# Patient Record
Sex: FEMALE | Race: BLACK OR AFRICAN AMERICAN | Employment: FULL TIME | URBAN - METROPOLITAN AREA
[De-identification: names, ages, dates, MRNs, and addresses within clinical notes are randomized per-mention and may not be internally consistent; named-entity substitution may affect disease eponyms.]

---

## 2017-03-02 PROBLEM — M17.11 PRIMARY OSTEOARTHRITIS OF RIGHT KNEE: Status: ACTIVE | Noted: 2017-03-02

## 2017-03-02 PROBLEM — E66.01 MORBID OBESITY, UNSPECIFIED OBESITY TYPE (HCC): Status: ACTIVE | Noted: 2017-03-02

## 2022-05-02 ENCOUNTER — TELEPHONE (OUTPATIENT)
Dept: FAMILY MEDICINE CLINIC | Facility: CLINIC | Age: 57
End: 2022-05-02

## 2022-05-02 NOTE — TELEPHONE ENCOUNTER
I spoke with patient regarding NO SHOW status for office visit on 05/02/2022 with Dr. Tabatha Garcia. I informed patient our office has a $94.18 NO SHOW FEE POLICY so we ask that you call 24 hours before your appt time. You can also use my-chart to cancel appt before 48 hours before your appointment. Patient will be charged $40.00 for any future NO SHOW appointments. Patient verbalized understanding and agrees.

## 2022-05-16 ENCOUNTER — OFFICE VISIT (OUTPATIENT)
Dept: FAMILY MEDICINE CLINIC | Facility: CLINIC | Age: 57
End: 2022-05-16
Payer: COMMERCIAL

## 2022-05-16 ENCOUNTER — PATIENT OUTREACH (OUTPATIENT)
Dept: FAMILY MEDICINE CLINIC | Facility: CLINIC | Age: 57
End: 2022-05-16

## 2022-05-16 VITALS
DIASTOLIC BLOOD PRESSURE: 88 MMHG | RESPIRATION RATE: 14 BRPM | BODY MASS INDEX: 50.38 KG/M2 | OXYGEN SATURATION: 95 % | TEMPERATURE: 99 F | SYSTOLIC BLOOD PRESSURE: 168 MMHG | HEART RATE: 66 BPM | HEIGHT: 58 IN | WEIGHT: 240 LBS

## 2022-05-16 DIAGNOSIS — Z13.0 SCREENING FOR ENDOCRINE, NUTRITIONAL, METABOLIC AND IMMUNITY DISORDER: ICD-10-CM

## 2022-05-16 DIAGNOSIS — Z13.21 SCREENING FOR ENDOCRINE, NUTRITIONAL, METABOLIC AND IMMUNITY DISORDER: ICD-10-CM

## 2022-05-16 DIAGNOSIS — Z12.11 SCREENING FOR COLON CANCER: ICD-10-CM

## 2022-05-16 DIAGNOSIS — E66.01 MORBID OBESITY WITH BMI OF 50.0-59.9, ADULT (HCC): ICD-10-CM

## 2022-05-16 DIAGNOSIS — I10 BENIGN ESSENTIAL HYPERTENSION: ICD-10-CM

## 2022-05-16 DIAGNOSIS — Z00.00 ANNUAL PHYSICAL EXAM: Primary | ICD-10-CM

## 2022-05-16 DIAGNOSIS — H61.23 BILATERAL IMPACTED CERUMEN: ICD-10-CM

## 2022-05-16 DIAGNOSIS — Z12.4 SCREENING FOR MALIGNANT NEOPLASM OF CERVIX: ICD-10-CM

## 2022-05-16 DIAGNOSIS — Z13.228 SCREENING FOR ENDOCRINE, NUTRITIONAL, METABOLIC AND IMMUNITY DISORDER: ICD-10-CM

## 2022-05-16 DIAGNOSIS — Z13.29 SCREENING FOR ENDOCRINE, NUTRITIONAL, METABOLIC AND IMMUNITY DISORDER: ICD-10-CM

## 2022-05-16 DIAGNOSIS — E55.9 VITAMIN D DEFICIENCY: ICD-10-CM

## 2022-05-16 DIAGNOSIS — Z12.31 ENCOUNTER FOR SCREENING MAMMOGRAM FOR BREAST CANCER: ICD-10-CM

## 2022-05-16 PROBLEM — K43.2 INCISIONAL HERNIA: Status: ACTIVE | Noted: 2022-05-16

## 2022-05-16 PROBLEM — M19.90 ARTHRITIS: Status: ACTIVE | Noted: 2022-05-16

## 2022-05-16 PROBLEM — M19.90 ARTHRITIS: Status: RESOLVED | Noted: 2022-05-16 | Resolved: 2022-05-16

## 2022-05-16 LAB
ALBUMIN SERPL-MCNC: 3.7 G/DL (ref 3.4–5)
ALBUMIN/GLOB SERPL: 1.1 {RATIO} (ref 1–2)
ALP LIVER SERPL-CCNC: 105 U/L
ALT SERPL-CCNC: 16 U/L
ANION GAP SERPL CALC-SCNC: 3 MMOL/L (ref 0–18)
AST SERPL-CCNC: 18 U/L (ref 15–37)
BASOPHILS # BLD AUTO: 0.01 X10(3) UL (ref 0–0.2)
BASOPHILS NFR BLD AUTO: 0.2 %
BILIRUB SERPL-MCNC: 0.8 MG/DL (ref 0.1–2)
BUN BLD-MCNC: 16 MG/DL (ref 7–18)
CALCIUM BLD-MCNC: 9.3 MG/DL (ref 8.5–10.1)
CHLORIDE SERPL-SCNC: 107 MMOL/L (ref 98–112)
CHOLEST SERPL-MCNC: 219 MG/DL (ref ?–200)
CO2 SERPL-SCNC: 29 MMOL/L (ref 21–32)
CREAT BLD-MCNC: 0.82 MG/DL
EOSINOPHIL # BLD AUTO: 0 X10(3) UL (ref 0–0.7)
EOSINOPHIL NFR BLD AUTO: 0 %
ERYTHROCYTE [DISTWIDTH] IN BLOOD BY AUTOMATED COUNT: 13.1 %
FASTING PATIENT LIPID ANSWER: YES
FASTING STATUS PATIENT QL REPORTED: YES
GLOBULIN PLAS-MCNC: 3.4 G/DL (ref 2.8–4.4)
GLUCOSE BLD-MCNC: 94 MG/DL (ref 70–99)
HCT VFR BLD AUTO: 42.8 %
HDLC SERPL-MCNC: 59 MG/DL (ref 40–59)
HGB BLD-MCNC: 13.3 G/DL
IMM GRANULOCYTES # BLD AUTO: 0.01 X10(3) UL (ref 0–1)
IMM GRANULOCYTES NFR BLD: 0.2 %
LDLC SERPL CALC-MCNC: 148 MG/DL (ref ?–100)
LYMPHOCYTES # BLD AUTO: 1.79 X10(3) UL (ref 1–4)
LYMPHOCYTES NFR BLD AUTO: 36 %
MCH RBC QN AUTO: 27.9 PG (ref 26–34)
MCHC RBC AUTO-ENTMCNC: 31.1 G/DL (ref 31–37)
MCV RBC AUTO: 89.9 FL
MONOCYTES # BLD AUTO: 0.51 X10(3) UL (ref 0.1–1)
MONOCYTES NFR BLD AUTO: 10.3 %
NEUTROPHILS # BLD AUTO: 2.65 X10 (3) UL (ref 1.5–7.7)
NEUTROPHILS # BLD AUTO: 2.65 X10(3) UL (ref 1.5–7.7)
NEUTROPHILS NFR BLD AUTO: 53.3 %
NONHDLC SERPL-MCNC: 160 MG/DL (ref ?–130)
OSMOLALITY SERPL CALC.SUM OF ELEC: 289 MOSM/KG (ref 275–295)
PLATELET # BLD AUTO: 215 10(3)UL (ref 150–450)
POTASSIUM SERPL-SCNC: 3.9 MMOL/L (ref 3.5–5.1)
PROT SERPL-MCNC: 7.1 G/DL (ref 6.4–8.2)
RBC # BLD AUTO: 4.76 X10(6)UL
SODIUM SERPL-SCNC: 139 MMOL/L (ref 136–145)
TRIGL SERPL-MCNC: 70 MG/DL (ref 30–149)
TSI SER-ACNC: 1.94 MIU/ML (ref 0.36–3.74)
VIT D+METAB SERPL-MCNC: 23.3 NG/ML (ref 30–100)
VLDLC SERPL CALC-MCNC: 13 MG/DL (ref 0–30)
WBC # BLD AUTO: 5 X10(3) UL (ref 4–11)

## 2022-05-16 PROCEDURE — 80061 LIPID PANEL: CPT | Performed by: FAMILY MEDICINE

## 2022-05-16 PROCEDURE — 80050 GENERAL HEALTH PANEL: CPT | Performed by: FAMILY MEDICINE

## 2022-05-16 PROCEDURE — 3008F BODY MASS INDEX DOCD: CPT | Performed by: FAMILY MEDICINE

## 2022-05-16 PROCEDURE — 3079F DIAST BP 80-89 MM HG: CPT | Performed by: FAMILY MEDICINE

## 2022-05-16 PROCEDURE — 36415 COLL VENOUS BLD VENIPUNCTURE: CPT | Performed by: FAMILY MEDICINE

## 2022-05-16 PROCEDURE — 3077F SYST BP >= 140 MM HG: CPT | Performed by: FAMILY MEDICINE

## 2022-05-16 PROCEDURE — 82306 VITAMIN D 25 HYDROXY: CPT | Performed by: FAMILY MEDICINE

## 2022-05-16 RX ORDER — LOSARTAN POTASSIUM 50 MG/1
1 TABLET ORAL AS DIRECTED
COMMUNITY
End: 2022-05-16 | Stop reason: ALTCHOICE

## 2022-05-16 RX ORDER — LISINOPRIL 10 MG/1
10 TABLET ORAL DAILY
COMMUNITY
End: 2022-05-16 | Stop reason: ALTCHOICE

## 2022-05-16 RX ORDER — CIPROFLOXACIN 250 MG/1
TABLET, FILM COATED ORAL
COMMUNITY
End: 2022-05-16 | Stop reason: ALTCHOICE

## 2022-05-16 RX ORDER — LOSARTAN POTASSIUM AND HYDROCHLOROTHIAZIDE 12.5; 1 MG/1; MG/1
1 TABLET ORAL DAILY
Qty: 30 TABLET | Refills: 1 | Status: SHIPPED | OUTPATIENT
Start: 2022-05-16

## 2022-05-16 RX ORDER — HYDROCODONE BITARTRATE AND ACETAMINOPHEN 5; 325 MG/1; MG/1
TABLET ORAL
COMMUNITY
End: 2022-05-16 | Stop reason: ALTCHOICE

## 2022-05-16 RX ORDER — PHENTERMINE HYDROCHLORIDE 37.5 MG/1
TABLET ORAL
COMMUNITY
End: 2022-05-16 | Stop reason: ALTCHOICE

## 2022-05-16 NOTE — TELEPHONE ENCOUNTER
Please let pt I apologize for forgetting to have her ears irrigated ( think I was distracted with the Gyne part of her exam and getting her labs done). She can either 1) try Debrox drops for now to soften the wax (let me know and i'll send in Rx) 2) come back for ear irrigation. Thanks.

## 2022-05-16 NOTE — TELEPHONE ENCOUNTER
Spoke to pt and relayed message regarding ear irrigation. Pt states she would like to try the ear drops and when she comes in to see Dea Barahona MD for f/up on 6/1/22 can have them flushed if needed. Pt advised to call if any new or worsening symptoms and we can bring her in sooner if needed to check ears. Please send debrox to pharmacy    Also relayed message to pt per Dea Barahona MD to increase her BP medication from losartan-hydrochlorothiazide 50-12.5mg to losartan-hydrochlorothiazide 100-12.5mg daily. Keep a BP log at home and bring readings in at f/up appt. Patient voiced understanding. She will  new BP med dose from pharmacy and the debrox when sent.

## 2022-05-16 NOTE — PROGRESS NOTES
Patient came in for draw of ordered fasting labs. Patient drawn out of R AC, x 1 attempt and tolerated well.  1 gold and 1 lav tube drawn.

## 2022-05-18 DIAGNOSIS — E55.9 VITAMIN D DEFICIENCY: Primary | ICD-10-CM

## 2022-05-18 DIAGNOSIS — E78.2 MIXED HYPERLIPIDEMIA: ICD-10-CM

## 2022-05-18 DIAGNOSIS — I10 BENIGN ESSENTIAL HYPERTENSION: ICD-10-CM

## 2022-05-18 RX ORDER — CHOLECALCIFEROL (VITAMIN D3) 1250 MCG
1 CAPSULE ORAL WEEKLY
Qty: 12 CAPSULE | Refills: 0 | Status: SHIPPED | OUTPATIENT
Start: 2022-05-18

## 2022-06-01 ENCOUNTER — OFFICE VISIT (OUTPATIENT)
Dept: FAMILY MEDICINE CLINIC | Facility: CLINIC | Age: 57
End: 2022-06-01
Payer: COMMERCIAL

## 2022-06-01 VITALS
OXYGEN SATURATION: 98 % | TEMPERATURE: 98 F | WEIGHT: 239 LBS | SYSTOLIC BLOOD PRESSURE: 124 MMHG | BODY MASS INDEX: 50 KG/M2 | HEART RATE: 63 BPM | DIASTOLIC BLOOD PRESSURE: 100 MMHG | RESPIRATION RATE: 20 BRPM

## 2022-06-01 DIAGNOSIS — I10 BENIGN ESSENTIAL HYPERTENSION: Primary | ICD-10-CM

## 2022-06-01 DIAGNOSIS — M17.11 PRIMARY OSTEOARTHRITIS OF RIGHT KNEE: ICD-10-CM

## 2022-06-01 PROCEDURE — 3074F SYST BP LT 130 MM HG: CPT | Performed by: FAMILY MEDICINE

## 2022-06-01 PROCEDURE — 3080F DIAST BP >= 90 MM HG: CPT | Performed by: FAMILY MEDICINE

## 2022-06-01 PROCEDURE — 99213 OFFICE O/P EST LOW 20 MIN: CPT | Performed by: FAMILY MEDICINE

## 2022-06-01 RX ORDER — LOSARTAN POTASSIUM AND HYDROCHLOROTHIAZIDE 12.5; 1 MG/1; MG/1
1 TABLET ORAL DAILY
Qty: 90 TABLET | Refills: 1 | Status: SHIPPED | OUTPATIENT
Start: 2022-06-01

## 2022-06-02 ENCOUNTER — TELEPHONE (OUTPATIENT)
Dept: FAMILY MEDICINE CLINIC | Facility: CLINIC | Age: 57
End: 2022-06-02

## 2022-06-02 NOTE — TELEPHONE ENCOUNTER
Please complete application for handicap placard for pt. Dx: osteoarthritis right knee. Please inform pt when ready for pickup. Thanks.

## 2022-06-03 NOTE — TELEPHONE ENCOUNTER
Spoke to pt and let her know form completed. Patient voiced understanding and states she will  next week.  Copy sent to scanning and original placed at  for pt

## 2022-06-03 NOTE — TELEPHONE ENCOUNTER
Changed length of disability to temporary (max of 6 months). Signed paperwork, place din nurse  bin. Thanks.

## 2023-01-05 ENCOUNTER — TELEPHONE (OUTPATIENT)
Dept: FAMILY MEDICINE CLINIC | Facility: CLINIC | Age: 58
End: 2023-01-05

## 2023-01-05 DIAGNOSIS — Z56.6 STRESS AT WORK: Primary | ICD-10-CM

## 2023-01-05 SDOH — HEALTH STABILITY - MENTAL HEALTH: OTHER PHYSICAL AND MENTAL STRAIN RELATED TO WORK: Z56.6
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
## 2023-01-10 PROBLEM — F51.02 ADJUSTMENT INSOMNIA: Status: ACTIVE | Noted: 2023-01-10

## 2023-01-10 PROBLEM — F43.23 ADJUSTMENT DISORDER WITH MIXED ANXIETY AND DEPRESSED MOOD: Status: ACTIVE | Noted: 2023-01-10

## 2023-01-17 ENCOUNTER — TELEPHONE (OUTPATIENT)
Dept: FAMILY MEDICINE CLINIC | Facility: CLINIC | Age: 58
End: 2023-01-17

## 2023-01-17 NOTE — TELEPHONE ENCOUNTER
Pt came to office to drop off her [place] paperwork and wants to be called once its done. Pt also has question in regards to her BP Medication.

## 2023-01-17 NOTE — TELEPHONE ENCOUNTER
I am unsure, since pt was seen by Dr. Meenakshi Esteban that day and OV note is not completed yet. Dr. Meenakshi Esteban, please review.  (Thanks for seeing this pt!)

## 2023-01-17 NOTE — TELEPHONE ENCOUNTER
Patient called confused about her blood pressure medication. Pt had office visit 1/10/2023 and Propanolol was added for her BP medication list. Patient is asking if she is supposed to take Losartan and propranolol together ?     Please advice

## 2023-01-18 ENCOUNTER — TELEPHONE (OUTPATIENT)
Dept: FAMILY MEDICINE CLINIC | Facility: CLINIC | Age: 58
End: 2023-01-18

## 2023-01-18 NOTE — TELEPHONE ENCOUNTER
Patient is to take the losartan hydrochlorothiazide with the propanolol. She is having anxiety during the day this will help with her uncontrolled blood pressure and also help with her daytime anxiety. She was also having sleep issues. This is all related to her current work issue. She was given trazodone to take at night. The propanolol may be taken during the day but if she feels it makes her too tired she can take at bedtime. Please inform.

## 2023-01-18 NOTE — TELEPHONE ENCOUNTER
Patient contacted our office requesting update. Patient was informed message routed to Dr. Demetria Hunt and that doctor is out of the office today. Informed would be updated as soon as response was obtained. Patient v/u and stated she will continue taking only 1 of the medications in the losartan in the meantime.

## 2023-02-20 ENCOUNTER — TELEPHONE (OUTPATIENT)
Dept: FAMILY MEDICINE CLINIC | Facility: CLINIC | Age: 58
End: 2023-02-20

## 2023-02-21 ENCOUNTER — TELEPHONE (OUTPATIENT)
Dept: FAMILY MEDICINE CLINIC | Facility: CLINIC | Age: 58
End: 2023-02-21

## 2023-02-21 DIAGNOSIS — H40.059 RAISED INTRAOCULAR PRESSURE, UNSPECIFIED LATERALITY: Primary | ICD-10-CM

## 2023-02-21 NOTE — TELEPHONE ENCOUNTER
Pt's eye doctor told her to see an ophthalmologist for increased blood pressure in the right eye. Pt is asking for referral from PCP. Referral pended for review and signature.

## 2023-02-23 ENCOUNTER — TELEPHONE (OUTPATIENT)
Dept: FAMILY MEDICINE CLINIC | Facility: CLINIC | Age: 58
End: 2023-02-23

## 2023-02-23 NOTE — TELEPHONE ENCOUNTER
The [facility] disability form completed faxed attached last office note. conformation fax received.        Copy sent to scan

## 2023-02-28 ENCOUNTER — OFFICE VISIT (OUTPATIENT)
Dept: FAMILY MEDICINE CLINIC | Facility: CLINIC | Age: 58
End: 2023-02-28
Payer: COMMERCIAL

## 2023-02-28 VITALS
OXYGEN SATURATION: 98 % | RESPIRATION RATE: 18 BRPM | HEART RATE: 86 BPM | SYSTOLIC BLOOD PRESSURE: 142 MMHG | DIASTOLIC BLOOD PRESSURE: 100 MMHG | BODY MASS INDEX: 52 KG/M2 | WEIGHT: 246.81 LBS | TEMPERATURE: 98 F

## 2023-02-28 DIAGNOSIS — F43.23 ADJUSTMENT DISORDER WITH MIXED ANXIETY AND DEPRESSED MOOD: ICD-10-CM

## 2023-02-28 DIAGNOSIS — F51.02 ADJUSTMENT INSOMNIA: ICD-10-CM

## 2023-02-28 DIAGNOSIS — I10 BENIGN ESSENTIAL HYPERTENSION: Primary | ICD-10-CM

## 2023-02-28 PROCEDURE — 99214 OFFICE O/P EST MOD 30 MIN: CPT | Performed by: FAMILY MEDICINE

## 2023-02-28 PROCEDURE — 3077F SYST BP >= 140 MM HG: CPT | Performed by: FAMILY MEDICINE

## 2023-02-28 PROCEDURE — 3080F DIAST BP >= 90 MM HG: CPT | Performed by: FAMILY MEDICINE

## 2023-02-28 RX ORDER — PROPRANOLOL HCL 60 MG
60 CAPSULE, EXTENDED RELEASE 24HR ORAL DAILY
Qty: 90 CAPSULE | Refills: 1 | Status: SHIPPED | OUTPATIENT
Start: 2023-02-28

## 2023-02-28 RX ORDER — LOSARTAN POTASSIUM AND HYDROCHLOROTHIAZIDE 12.5; 1 MG/1; MG/1
1 TABLET ORAL DAILY
Qty: 90 TABLET | Refills: 1 | Status: SHIPPED | OUTPATIENT
Start: 2023-02-28

## 2023-03-02 ENCOUNTER — TELEPHONE (OUTPATIENT)
Dept: FAMILY MEDICINE CLINIC | Facility: CLINIC | Age: 58
End: 2023-03-02

## 2023-03-02 NOTE — TELEPHONE ENCOUNTER
Patient called office asking if her [facility] paperwork has been sent to her job.

## 2023-03-03 NOTE — TELEPHONE ENCOUNTER
Pt informed that [facility] paperwork was updated with end date 4/17/2023 and signed by provider and faxed to [phone].

## 2023-03-20 ENCOUNTER — TELEPHONE (OUTPATIENT)
Dept: FAMILY MEDICINE CLINIC | Facility: CLINIC | Age: 58
End: 2023-03-20

## 2023-03-20 RX ORDER — TIMOLOL MALEATE 5 MG/ML
SOLUTION/ DROPS OPHTHALMIC
COMMUNITY
Start: 2023-03-13

## 2023-03-20 RX ORDER — BROMFENAC SODIUM 0.7 MG/ML
SOLUTION/ DROPS OPHTHALMIC
COMMUNITY
Start: 2023-03-16

## 2023-03-20 NOTE — TELEPHONE ENCOUNTER
Pt would like to discuss weight loss and medications for weight loss with PCP. Pt advised that she needs to schedule an appointment, pt v/u.

## 2023-04-11 ENCOUNTER — OFFICE VISIT (OUTPATIENT)
Dept: FAMILY MEDICINE CLINIC | Facility: CLINIC | Age: 58
End: 2023-04-11
Payer: COMMERCIAL

## 2023-04-11 VITALS
WEIGHT: 246.63 LBS | RESPIRATION RATE: 18 BRPM | HEART RATE: 60 BPM | BODY MASS INDEX: 52 KG/M2 | TEMPERATURE: 97 F | DIASTOLIC BLOOD PRESSURE: 100 MMHG | OXYGEN SATURATION: 97 % | SYSTOLIC BLOOD PRESSURE: 178 MMHG

## 2023-04-11 DIAGNOSIS — I10 BENIGN ESSENTIAL HYPERTENSION: Primary | ICD-10-CM

## 2023-04-11 DIAGNOSIS — F43.23 ADJUSTMENT DISORDER WITH MIXED ANXIETY AND DEPRESSED MOOD: ICD-10-CM

## 2023-04-11 DIAGNOSIS — F51.02 ADJUSTMENT INSOMNIA: ICD-10-CM

## 2023-04-11 DIAGNOSIS — E66.01 MORBID OBESITY WITH BMI OF 50.0-59.9, ADULT (HCC): ICD-10-CM

## 2023-04-11 PROCEDURE — 3080F DIAST BP >= 90 MM HG: CPT | Performed by: FAMILY MEDICINE

## 2023-04-11 PROCEDURE — 3077F SYST BP >= 140 MM HG: CPT | Performed by: FAMILY MEDICINE

## 2023-04-11 PROCEDURE — 99214 OFFICE O/P EST MOD 30 MIN: CPT | Performed by: FAMILY MEDICINE

## 2023-04-12 ENCOUNTER — TELEPHONE (OUTPATIENT)
Dept: FAMILY MEDICINE CLINIC | Facility: CLINIC | Age: 58
End: 2023-04-12

## 2023-04-12 NOTE — TELEPHONE ENCOUNTER
Patient states Insurance does not cover wegovy and needs appeal.     Informed patient we will work on appeal as soon as we get forms and keep her updated.  Patient v/u

## 2023-05-08 ENCOUNTER — PATIENT OUTREACH (OUTPATIENT)
Dept: FAMILY MEDICINE CLINIC | Facility: CLINIC | Age: 58
End: 2023-05-08

## 2023-05-08 NOTE — PROGRESS NOTES
LMOM to call office back regarding NO SHOW STATUS for office visit on 5/8/23 with Dr Dm Girard so we can reschedule appt. Informed our office has a $62.25 NO SHOW FEE POLICY, so we ask that you call at least 24 hours before your appt time. Informed was charged a $40.00 No Show fee.

## 2023-05-30 DIAGNOSIS — E66.01 MORBID OBESITY WITH BMI OF 50.0-59.9, ADULT (HCC): ICD-10-CM

## 2023-05-30 RX ORDER — SEMAGLUTIDE 0.5 MG/.5ML
INJECTION, SOLUTION SUBCUTANEOUS
Qty: 2 ML | Refills: 0 | Status: SHIPPED | OUTPATIENT
Start: 2023-05-30

## 2023-06-05 ENCOUNTER — OFFICE VISIT (OUTPATIENT)
Dept: FAMILY MEDICINE CLINIC | Facility: CLINIC | Age: 58
End: 2023-06-05
Payer: COMMERCIAL

## 2023-06-05 VITALS
TEMPERATURE: 98 F | RESPIRATION RATE: 18 BRPM | DIASTOLIC BLOOD PRESSURE: 92 MMHG | HEART RATE: 71 BPM | SYSTOLIC BLOOD PRESSURE: 162 MMHG | OXYGEN SATURATION: 99 % | BODY MASS INDEX: 49 KG/M2 | WEIGHT: 235.81 LBS

## 2023-06-05 DIAGNOSIS — E66.01 MORBID OBESITY WITH BMI OF 50.0-59.9, ADULT (HCC): ICD-10-CM

## 2023-06-05 DIAGNOSIS — I10 BENIGN ESSENTIAL HYPERTENSION: Primary | ICD-10-CM

## 2023-06-05 PROCEDURE — 99214 OFFICE O/P EST MOD 30 MIN: CPT | Performed by: FAMILY MEDICINE

## 2023-06-05 PROCEDURE — 3077F SYST BP >= 140 MM HG: CPT | Performed by: FAMILY MEDICINE

## 2023-06-05 PROCEDURE — 3080F DIAST BP >= 90 MM HG: CPT | Performed by: FAMILY MEDICINE

## 2023-06-05 RX ORDER — AMLODIPINE BESYLATE 5 MG/1
5 TABLET ORAL DAILY
Qty: 30 TABLET | Refills: 1 | Status: SHIPPED | OUTPATIENT
Start: 2023-06-05

## 2023-06-21 ENCOUNTER — TELEPHONE (OUTPATIENT)
Dept: FAMILY MEDICINE CLINIC | Facility: CLINIC | Age: 58
End: 2023-06-21

## 2023-06-21 NOTE — TELEPHONE ENCOUNTER
Pt called office stating that her pharmacy is out of her weight loss medication. Pt states that her pharmacy needs a new prescription for ozempic instead of wegoby.

## 2023-06-22 NOTE — TELEPHONE ENCOUNTER
Please inform pt that she might have to go through another PA if we give new Rx for Ozempic, which may take up to another month or so. Please confirm with pt the dose she needs of Ozempic vs if she wants WEgovy instead. Thanks.

## 2023-06-22 NOTE — TELEPHONE ENCOUNTER
Pt instructed to call pharmacies in her area to see if they have WEgovy 0.75mg in stock. We can sent it to whichever pharmacy is in stock.

## 2023-06-29 ENCOUNTER — TELEPHONE (OUTPATIENT)
Dept: FAMILY MEDICINE CLINIC | Facility: CLINIC | Age: 58
End: 2023-06-29

## 2023-06-29 DIAGNOSIS — E66.01 MORBID OBESITY WITH BMI OF 50.0-59.9, ADULT (HCC): Primary | ICD-10-CM

## 2023-08-01 ENCOUNTER — TELEPHONE (OUTPATIENT)
Dept: FAMILY MEDICINE CLINIC | Facility: CLINIC | Age: 58
End: 2023-08-01

## 2023-08-01 NOTE — TELEPHONE ENCOUNTER
Spoke to pt's pharmacy and was informed that they do not have Wegovy in stock, but they will try to order it for tomorrow. Pt informed and verbalized understanding.

## 2023-08-04 ENCOUNTER — TELEPHONE (OUTPATIENT)
Dept: FAMILY MEDICINE CLINIC | Facility: CLINIC | Age: 58
End: 2023-08-04

## 2023-08-04 DIAGNOSIS — E66.01 MORBID OBESITY WITH BMI OF 50.0-59.9, ADULT (HCC): Primary | ICD-10-CM

## 2023-08-04 NOTE — TELEPHONE ENCOUNTER
Patient called requesting refill for [facility] DEBBY SIMMONS. Patient is currently taking 0.5 mg but states she has been taking dose for 2 months, and is starting to notice more hunger. Patient is asking if she can go up to the 1 mg dose ?     Please advice

## 2023-08-18 DIAGNOSIS — I10 BENIGN ESSENTIAL HYPERTENSION: ICD-10-CM

## 2023-08-22 RX ORDER — AMLODIPINE BESYLATE 5 MG/1
5 TABLET ORAL DAILY
Qty: 90 TABLET | Refills: 1 | Status: SHIPPED | OUTPATIENT
Start: 2023-08-22

## 2023-08-22 NOTE — TELEPHONE ENCOUNTER
Refill no protocol available:     Pt requesting refill of   Requested Prescriptions     Pending Prescriptions Disp Refills    AMLODIPINE 5 MG Oral Tab [Pharmacy Med Name: AMLODIPINE BESYLATE 5MG TABLETS] 30 tablet 1     Sig: TAKE 1 TABLET(5 MG) BY MOUTH DAILY       Sent to Provider for review:  Benign essential hypertension  - BP not adequately controlled. - START Amlodipine 5 mg daily, R/B/SE of new med d/w pt  - keep BP log and send via [employer] or call in to RN  - if no improvement in BP, then will check labs again. - amLODIPine 5 MG Oral Tab; Take 1 tablet (5 mg total) by mouth daily. Dispense: 30 tablet; Refill: 1    Last Time Medication was Filled:  6/5/2023    Last Office Visit with Provider: 6/5/2023    No future appointments.

## 2023-09-05 DIAGNOSIS — I10 BENIGN ESSENTIAL HYPERTENSION: ICD-10-CM

## 2023-09-06 RX ORDER — LOSARTAN POTASSIUM AND HYDROCHLOROTHIAZIDE 12.5; 1 MG/1; MG/1
1 TABLET ORAL DAILY
Qty: 90 TABLET | Refills: 1 | Status: SHIPPED | OUTPATIENT
Start: 2023-09-06

## 2023-09-06 NOTE — TELEPHONE ENCOUNTER
Refill Failed Protocol:     Pt requesting refill of   Requested Prescriptions     Pending Prescriptions Disp Refills    LOSARTAN POTASSIUM-HCTZ 100-12.5 MG Oral Tab [Pharmacy Med Name: LOSARTAN/HCTZ 100/12.5MG TABLETS] 90 tablet 1     Sig: TAKE 1 TABLET BY MOUTH DAILY     Last Time Medication was Filled:  2/28/2023    Last Office Visit with Provider: 6/5/2023    Unable to approve refill,    Hypertension Medications Protocol [id number]/05/2023 02:38 PM    CMP or BMP in past 12 months    Last serum creatinine< 2.0    Appointment in past 6 or next 3 months        Appt. scheduled on 9/18/2023

## 2023-09-18 ENCOUNTER — OFFICE VISIT (OUTPATIENT)
Dept: FAMILY MEDICINE CLINIC | Facility: CLINIC | Age: 58
End: 2023-09-18
Payer: COMMERCIAL

## 2023-09-18 VITALS
RESPIRATION RATE: 18 BRPM | HEART RATE: 64 BPM | TEMPERATURE: 98 F | BODY MASS INDEX: 50 KG/M2 | OXYGEN SATURATION: 96 % | WEIGHT: 238.63 LBS | SYSTOLIC BLOOD PRESSURE: 142 MMHG | DIASTOLIC BLOOD PRESSURE: 86 MMHG

## 2023-09-18 DIAGNOSIS — E66.01 MORBID OBESITY WITH BMI OF 50.0-59.9, ADULT (HCC): ICD-10-CM

## 2023-09-18 DIAGNOSIS — Z13.29 SCREENING FOR ENDOCRINE, METABOLIC, AND IMMUNITY DISORDER: ICD-10-CM

## 2023-09-18 DIAGNOSIS — I10 BENIGN ESSENTIAL HYPERTENSION: Primary | ICD-10-CM

## 2023-09-18 DIAGNOSIS — E55.9 VITAMIN D DEFICIENCY: ICD-10-CM

## 2023-09-18 DIAGNOSIS — Z13.0 SCREENING FOR ENDOCRINE, METABOLIC, AND IMMUNITY DISORDER: ICD-10-CM

## 2023-09-18 DIAGNOSIS — Z13.228 SCREENING FOR ENDOCRINE, METABOLIC, AND IMMUNITY DISORDER: ICD-10-CM

## 2023-09-18 RX ORDER — CIPROFLOXACIN 250 MG/1
1 TABLET, FILM COATED ORAL 2 TIMES DAILY
COMMUNITY

## 2023-09-18 RX ORDER — HYDROCODONE BITARTRATE AND ACETAMINOPHEN 5; 325 MG/1; MG/1
1 TABLET ORAL EVERY 12 HOURS PRN
COMMUNITY

## 2023-09-18 RX ORDER — LOSARTAN POTASSIUM 50 MG/1
TABLET ORAL
COMMUNITY

## 2023-09-18 RX ORDER — PHENTERMINE HYDROCHLORIDE 37.5 MG/1
TABLET ORAL
COMMUNITY

## 2023-10-02 ENCOUNTER — TELEPHONE (OUTPATIENT)
Dept: FAMILY MEDICINE CLINIC | Facility: CLINIC | Age: 58
End: 2023-10-02

## 2023-10-02 DIAGNOSIS — E66.01 MORBID OBESITY WITH BMI OF 50.0-59.9, ADULT (HCC): ICD-10-CM

## 2023-10-02 NOTE — TELEPHONE ENCOUNTER
Pt called, states pharmcy sent in PA for [facility]GRETA SIMMONS.     PA started through Cover My Meds

## 2023-10-03 NOTE — TELEPHONE ENCOUNTER
Pt advised to find out the phone number for PA requests from her insurance company so that our office can complete PA over the phone. , pt will call back with phone number.

## 2023-10-03 NOTE — TELEPHONE ENCOUNTER
Pt will call back with phone number to call so that PA can be done for [facility][place]. Phone # for [phone] [facility] Rx). [facility] had pt under different member ID. Member ID: [id number]. PA for [facility][place] completed over the phone with [facility] and pt approved for 1 year. LMOM to return call to the office. Provided pt office phone [phone] along with office hours. Pt called back. Pt asking for Rx Wegovy 1 mg to be sent to [place] in [place], since she was not able to  [facility][place] from [place] in [facility] is out of stock x 2 months). Rx sent to [place] in [place].

## 2023-10-03 NOTE — TELEPHONE ENCOUNTER
Pt is calling office asking for her Wegovy medication JAZMINE hernandez sent brando. Pt states ana sent it again on 10/02/2023. Pt also wants to know if she can get a 2 month prescription for oct and nov.

## 2023-10-04 NOTE — TELEPHONE ENCOUNTER
Pt called, states her pharmacy does not have Wegovy 1 mg, but they do have the 1.7 mg. Pt wants to know if she can have prescription for Wegovy 1.7 mg.    Rx pended for provider review and signature.

## 2023-10-04 NOTE — TELEPHONE ENCOUNTER
Pt is okay with taking the higher dose. States she knows what to take for constipation. Will let us know if the s/e get worse and she cannot tolerate them.

## 2023-10-04 NOTE — TELEPHONE ENCOUNTER
Before I sign the 1.7 mg dose, please inform her that she might experience increased nausea and constipation, especially if she has not been on the 1.0 mg dose x 4 doses. Let me know what she decides. Thanks.

## 2024-01-23 ENCOUNTER — TELEPHONE (OUTPATIENT)
Dept: FAMILY MEDICINE CLINIC | Facility: CLINIC | Age: 59
End: 2024-01-23

## 2024-01-23 NOTE — TELEPHONE ENCOUNTER
Called pt, states she has not been able to get Wegovy for about 4 months. States she was doing good while on it. Wants to know if PCP prescribes the new weight loss med.    Informed pt that Pcp does prescribe the new weight loss medication. Upon review of chart, pt was due for annual physical around 12/18/2023.     Offered appt to pt. She accepted. Appt set for 1/19/2024.    Routed to provider for review.

## 2024-01-24 NOTE — TELEPHONE ENCOUNTER
Ok. She needs the appt to discuss change in medication. Will address at her upcoming 1/29/24 appt.

## 2024-01-29 ENCOUNTER — OFFICE VISIT (OUTPATIENT)
Dept: FAMILY MEDICINE CLINIC | Facility: CLINIC | Age: 59
End: 2024-01-29
Payer: COMMERCIAL

## 2024-01-29 VITALS
DIASTOLIC BLOOD PRESSURE: 92 MMHG | HEART RATE: 69 BPM | WEIGHT: 247.81 LBS | BODY MASS INDEX: 53.46 KG/M2 | TEMPERATURE: 97 F | HEIGHT: 57 IN | SYSTOLIC BLOOD PRESSURE: 140 MMHG | RESPIRATION RATE: 16 BRPM

## 2024-01-29 DIAGNOSIS — Z12.31 ENCOUNTER FOR SCREENING MAMMOGRAM FOR MALIGNANT NEOPLASM OF BREAST: ICD-10-CM

## 2024-01-29 DIAGNOSIS — Z00.00 ANNUAL PHYSICAL EXAM: Primary | ICD-10-CM

## 2024-01-29 DIAGNOSIS — E66.01 MORBID OBESITY WITH BMI OF 50.0-59.9, ADULT (HCC): ICD-10-CM

## 2024-01-29 DIAGNOSIS — E55.9 VITAMIN D DEFICIENCY: ICD-10-CM

## 2024-01-29 DIAGNOSIS — Z23 NEED FOR VACCINATION: ICD-10-CM

## 2024-01-29 DIAGNOSIS — Z12.11 ENCOUNTER FOR SCREENING COLONOSCOPY: ICD-10-CM

## 2024-01-29 DIAGNOSIS — I10 BENIGN ESSENTIAL HYPERTENSION: ICD-10-CM

## 2024-01-29 PROCEDURE — 3080F DIAST BP >= 90 MM HG: CPT | Performed by: FAMILY MEDICINE

## 2024-01-29 PROCEDURE — 3008F BODY MASS INDEX DOCD: CPT | Performed by: FAMILY MEDICINE

## 2024-01-29 PROCEDURE — 3077F SYST BP >= 140 MM HG: CPT | Performed by: FAMILY MEDICINE

## 2024-01-29 PROCEDURE — 99396 PREV VISIT EST AGE 40-64: CPT | Performed by: FAMILY MEDICINE

## 2024-01-29 RX ORDER — TIRZEPATIDE 2.5 MG/.5ML
2.5 INJECTION, SOLUTION SUBCUTANEOUS WEEKLY
Qty: 2 ML | Refills: 1 | Status: SHIPPED | OUTPATIENT
Start: 2024-01-29 | End: 2024-02-05 | Stop reason: ALTCHOICE

## 2024-01-30 ENCOUNTER — TELEPHONE (OUTPATIENT)
Dept: FAMILY MEDICINE CLINIC | Facility: CLINIC | Age: 59
End: 2024-01-30

## 2024-01-30 DIAGNOSIS — I10 BENIGN ESSENTIAL HYPERTENSION: ICD-10-CM

## 2024-01-30 DIAGNOSIS — E66.01 MORBID OBESITY WITH BMI OF 50.0-59.9, ADULT (HCC): Primary | ICD-10-CM

## 2024-01-30 NOTE — TELEPHONE ENCOUNTER
Pt called, states her pharmacy told her she needs a PA for Zepbound.    PA started via Cover My Meds. Waiting for response.

## 2024-02-01 NOTE — TELEPHONE ENCOUNTER
I recommend for pt to call her insurance to see what GLP-1 agonists are covered.  She is not diabetic, so she would need to ask about:    - Wegovy  - Zepbound  - Saxenda    Thanks.

## 2024-02-02 NOTE — TELEPHONE ENCOUNTER
Spoke to patient and discussed PCP's recommendation. Patient stated her insurance company will approve wegovy but unable to get the dose she needs due to medication being on backorder. Patient will office back to inform us of coverage of any other GLP-1 medications.

## 2024-02-02 NOTE — TELEPHONE ENCOUNTER
Patient called back she said that insurance will approve Saxenda it is on the list but needs a prior authorization

## 2024-02-02 NOTE — TELEPHONE ENCOUNTER
I want to make her aware that Saxenda is the same class of medication as Wegovy, but this is a daily injection (not once weekly like Wegovy). Is she still agreeable to this?

## 2024-02-03 NOTE — PROGRESS NOTES
Chief Complaint   Patient presents with    Well Adult       HPI:   Ariadna Bullard is a 58 year old female who presents for a complete physical without gyne exam.   Patient feels well, dental visit up to date, no hearing problem.  Vaccinations declines all vaccines today    LMP: post-menopausal  Sexual activity:monogamy   Contraception:post-menopausa;  Exercise: walking.  Diet:  plans to start a healthier diet    Wt Readings from Last 3 Encounters:   01/29/24 247 lb 12.8 oz (112.4 kg)   09/18/23 238 lb 9.6 oz (108.2 kg)   06/05/23 235 lb 12.8 oz (107 kg)      BP Readings from Last 3 Encounters:   01/29/24 (!) 140/92   09/18/23 142/86   06/05/23 (!) 162/92     Patient's last menstrual period was 06/15/2016 (approximate).     Annual physical:  Overall pt states she feels well.     LMP: post-menopausal  Sexually Active: monogamous  Last pap smear: overdue  Last mammogram: overdue, ordered  Last colonoscopy: overdue, agreable to Cologuard  Last DEXA Scan: n/a    Exercise: walking  Diet: plans to start eating healthier     HTN f-u: Pt states she is doing well on her current BP medications:  taking Amlodipine, Losartan-hydrochlorothiazide, and Propranolol.  She has no HA, no CP, palpitations, no leg swelling.     Previous HPI: Pt states she woke up this morning with + HA.  BP at home was in the 150s.  She has not had any medication for 3-4 wks. She has no CP, no SOB, no palpitations, and no leg swelling.     Previous HPI: Pt took her medication today, but is having elevated BP in office. When she last checked her BP at home, it was 130/82.  She feels anxious about her upcoming return to work next week. She has no CP, no SOB, palpitations, no leg swelling.       Previous HPI: Pt is currnetly on Losartan-hydrochlorothiazide and recently started on Propranolol by my office partner at her previous visit. This has been helping control the BP, She did not take her BP meds yet this morning since she did not eat (she tolerated  the medications when taken with food).     Previous HPI: Pt states she mistakenly kept taking the old medication Losartan-hydrochlorothiazide 50/12.5 up until 1 week ago, she picke dup and started the new Rx for the higher dose at 100/12.5 mg.  She had been waking with a HA daily while on the old medication, but not since starting the new dose.  She has no CP, no palptiations, no leg swelling. She feels good on this medication.      Previous HPI from 5/2022: Pt was dx'd 7-8 yrs ago, has been on different medications, but currently on Losartan-hydrochlorothiazide 50/12.5 mg daily. She took her medication this morning. She has some leg swelling when the weather is warm out, but has no HA, CP, SOB, palpitations.      Obesity: She was most recently on Ozempic, since Wegovy has been on backorder.  She has heard about the new medication, Zepbound.      Previous HPI: She has not had her Wegovy since 7/2023 due to medication on backorder at her pharmacy.  She is worried since she has regained some of her weight- has lost 20 lbs so far.  Her pharmacy said there would be some available for pickup this week- she will go to pharmacy after this appt to check. The only SE she had was constipation, but was managed with stool softener.      Previous HPI: She has lost 11 lbs since starting Wegovy 2 months ago.  She has conscious eating and eats off of a saucer \"so that my body is in control of the eating.\"She is also doing intermittent fasting from 12p-8p daily. .  She experienced \"a tiny bit of nausea\" upon starting the medication, but is better now. Her pharmacy has been out of stock of Wegovy. Pt doesn't know what to do next.     Previous HPI from 4/2023: pt is frustrated since she has bee working the last 2 months with a  2 days a week, works out by herself 1 day a week.  She does circuit training and wt training.  She follows a low carb diet and incorporates intermittent fasting.  She would like help with wt  loss and mentions her friend is on Ozempic. She had a normal TSH in 2022.  She has no personal or family Hx of thyroid Ca.     Vit D Def: has been low in past and took high dose Vitamin D.  Due to recheck level.                                     Current Outpatient Medications   Medication Sig Dispense Refill    Tirzepatide-Weight Management (ZEPBOUND) 2.5 MG/0.5ML Subcutaneous Solution Auto-injector Inject 2.5 mg into the skin once a week. 2 mL 1    losartan 50 MG Oral Tab       Losartan Potassium-HCTZ 100-12.5 MG Oral Tab Take 1 tablet by mouth daily. 90 tablet 1    amLODIPine 5 MG Oral Tab TAKE 1 TABLET(5 MG) BY MOUTH DAILY 90 tablet 1    ELDERBERRY OR Take 1 tablet by mouth daily.      Multiple Vitamins-Minerals (MULTIVITAMIN ADULTS 50+ OR) Take 1 tablet by mouth daily.      ibuprofen 800 MG Oral Tab Take 1 tablet (800 mg total) by mouth 3 (three) times daily with meals.      carbamide peroxide 6.5 % Otic Solution Place 5 drops into both ears 2 (two) times daily. For no more than 4 days (Patient not taking: Reported on 2024) 15 mL 0      Past Medical History:   Diagnosis Date    Anemia     Essential hypertension, benign 2014    Hiatal hernia 2014    History of gastric bypass 2013    Osteoarthritis     Unspecified essential hypertension       Past Surgical History:   Procedure Laterality Date          x 2    CHOLECYSTECTOMY      [facility]    GASTRIC BYPASS,OBESE<100CM FLORA-EN-Y      [facility]    HERNIA SURGERY      [facility]      Family History   Problem Relation Age of Onset    Diabetes Father     Heart Disease Father         CHF    Breast Cancer Paternal Aunt 40    Genito-Urinary Disorder Sister 33        hysterectomy      Social History:  Social History     Socioeconomic History    Marital status:     Number of children: 2   Occupational History    Occupation:    Tobacco Use    Smoking status: Never    Smokeless tobacco:  Never   Vaping Use    Vaping Use: Never used   Substance and Sexual Activity    Alcohol use: No    Drug use: No    Sexual activity: Yes     Partners: Male     Birth control/protection: Vasectomy   Social History Narrative    . Works as a . No alcohol or drug use.  Knee pain has interfered with her ability to exercise. 2 sons.       Allergies:  No Known Allergies     REVIEW OF SYSTEMS:     Review of Systems   Constitutional:  Negative for appetite change and fatigue.   Cardiovascular:  Negative for chest pain, palpitations and leg swelling.   Gastrointestinal:  Negative for abdominal pain, constipation, nausea and vomiting.   Genitourinary:  Negative for dysuria.   Neurological:  Negative for dizziness and headaches.        EXAM:   BP (!) 140/92 (BP Location: Left arm, Patient Position: Sitting)   Pulse 69   Temp 97.3 °F (36.3 °C) (Temporal)   Resp 16   Ht 4' 9\" (1.448 m)   Wt 247 lb 12.8 oz (112.4 kg)   LMP 06/15/2016 (Approximate)   PF 96 L/min   BMI 53.62 kg/m²    GENERAL: WD/WN in no acute distress.   HEENT: PERRLA and EOMI.  OP moist no lesions.TM WNL, jacklyn.Normal ears canals bilaterally.  Neck is supple, with no cervical LAD or thyroid abnormalities.  LUNGS: are clear to auscultation bilaterally, with no wheeze, rhonchi, or rales.   HEART: is RRR.  S1, S2, with no murmurs,clicks, gallops  BREAST:No palpable masses, no axillary LAD, no nipple D/C, drainage or retractions.  ABDOMEN: is soft,NBS, NT/ND with no HSM.  No rebound or guarding. No CVA tenderness, no hernias.   EXAM: Speculum placed and vaginal wall visualized without abnormalities. Liquid Based PAP performed.  Cervix is normal, non-friable, with a closed OS and no abnormal D/C. Bimanual exam shows no CMT or adenexal masses or tenderness.  RECTAL EXAM: deferred  NEURO: Cranial nerves II-XII normal,no focal abnormalities, and reflexes coordination and gait normal and symmetric.Sensation intact.  EXTREMETIES:  are symmetric with no cyanosis, clubbing, or edema.  MS: Normal muscles tones, no joints abnormalities.  SKIN: Normal color, turgor, no lesions, rashes or wounds.  PSYCH: normal affect and mood.    ASSESSMENT AND PLAN:     58 year old female with     1. Annual physical exam  Routine labs ordered today, await results. Counseled pt on healthy lifestyle changes. Vaccines today: declines all vaccines . Contraception: post-menopausal     Last pap smear: overdue  Last mammogram: overdue, ordered  Last colonoscopy: overdue, agreable to Cologuard  Last DEXA Scan: n/a       2. Benign essential hypertension  - BP stable  - cont  Amlodipine 5 mg was started, Losartan-hydrochlorothiazide and Propranolol  - due to check labs    - Tirzepatide-Weight Management (ZEPBOUND) 2.5 MG/0.5ML Subcutaneous Solution Auto-injector; Inject 2.5 mg into the skin once a week.  Dispense: 2 mL; Refill: 1    3. Vitamin D deficiency  - has been low in the past, check this     4. Encounter for screening mammogram for malignant neoplasm of breast    - [facility] LIS 2D+3D SCREENING BILAT (CPT=77067/41604); Future    5. Morbid obesity with BMI of 50.0-59.9, adult (HCC)  - in past, pt has lost 20 lb over the last 3-4 months.  on Wegovy (pharmacy on back order been without medication since 7/2023)  - pt was Ozempic, which is also on back order now  - Tx sent for Zepbound  - f-u in 3 months  - Tirzepatide-Weight Management (ZEPBOUND) 2.5 MG/0.5ML Subcutaneous Solution Auto-injector; Inject 2.5 mg into the skin once a week.  Dispense: 2 mL; Refill: 1    6. Encounter for screening colonoscopy    - COLOGUARD COLON CANCER SCREENING (EXTERNAL)    7. Need for vaccination    - Influenza Refused        Pt's was recommended low fat diet and aerobic exercise 30 minutes three times weekly.   Health maintenance.   Osteoporosis prevention addressed  Recommended whole food type diet, eliminate processed food/low sugar and low sat fat diet      The patient indicates  understanding of these issues and agrees to the plan.    Return in about 3 months (around 4/29/2024) for HTN, obesity weight loss medication f-u , annual physical in 3 months.

## 2024-02-05 RX ORDER — PEN NEEDLE, DIABETIC 30 GX3/16"
1 NEEDLE, DISPOSABLE MISCELLANEOUS DAILY
Qty: 90 EACH | Refills: 0 | Status: SHIPPED | OUTPATIENT
Start: 2024-02-05 | End: 2024-05-05

## 2024-02-05 RX ORDER — LIRAGLUTIDE 6 MG/ML
INJECTION, SOLUTION SUBCUTANEOUS
Qty: 1 EACH | Refills: 0 | Status: SHIPPED | OUTPATIENT
Start: 2024-02-05 | End: 2024-04-03

## 2024-02-05 NOTE — TELEPHONE ENCOUNTER
Called pt, informed of Saxenda is a daily injection. Pt is agreeable to starting medication.    Routed to provider for review.

## 2024-02-07 NOTE — TELEPHONE ENCOUNTER
PA approved.    [id number]. SAXENDA INJ 18MG/3ML is approved through 06/06/2024.    Called pt to informed of PA approval. Pt v/u.

## 2024-02-08 NOTE — TELEPHONE ENCOUNTER
Ok for the 18 mg/3mL If that is how it comes.    Dosing for Saxenda is 0.6mg--> 1.2 mg --> 1.8 mg (goes up by 0.6 mg increments) o.5 mg is not a dose choice for this drug. Please clarify.

## 2024-02-08 NOTE — TELEPHONE ENCOUNTER
PA had question regarding dose. Asked if pt needed more than max dose of 0.5 mg per day. Answered yes to question. PA was approved.

## 2024-02-08 NOTE — TELEPHONE ENCOUNTER
Pt called, states she is still having problems getting Rx.    Called [facility], they state Rx still needs PA because the current order exceeds the max daily dose the insurance is willing to cover.     Pharmacy states daily max dose is 0.5 mg. Current order is for 0.6 mg.    Current PA approved for 18 mg / 3 mL. Can the sig be changed to 0.5 mg?    Routed to provider for review and advice?

## 2024-02-08 NOTE — TELEPHONE ENCOUNTER
Noted. Thanks. Please inform pt this was approved. Let me know if there is anything else I need to send or sign. thanks

## 2024-02-12 NOTE — TELEPHONE ENCOUNTER
Outreach call to pt no answer left VM to inform medication has been approved by insurance, pt to contact pharmacy to refill medications if not already obtained.   Provided office number if any questions or concerns

## 2024-04-30 DIAGNOSIS — I10 BENIGN ESSENTIAL HYPERTENSION: ICD-10-CM

## 2024-05-01 RX ORDER — AMLODIPINE BESYLATE 5 MG/1
5 TABLET ORAL DAILY
Qty: 90 TABLET | Refills: 1 | Status: SHIPPED | OUTPATIENT
Start: 2024-05-01

## 2024-05-01 RX ORDER — LOSARTAN POTASSIUM AND HYDROCHLOROTHIAZIDE 12.5; 1 MG/1; MG/1
1 TABLET ORAL DAILY
Qty: 90 TABLET | Refills: 1 | Status: SHIPPED | OUTPATIENT
Start: 2024-05-01

## 2024-05-01 NOTE — TELEPHONE ENCOUNTER
Refill Failed Protocol:     Pt requesting refill of   Requested Prescriptions     Pending Prescriptions Disp Refills    LOSARTAN POTASSIUM-HCTZ 100-12.5 MG Oral Tab [Pharmacy Med Name: LOSARTAN/HCTZ 100/12.5MG TABLETS] 90 tablet 1     Sig: TAKE 1 TABLET BY MOUTH DAILY    AMLODIPINE 5 MG Oral Tab [Pharmacy Med Name: AMLODIPINE BESYLATE 5MG TABLETS] 90 tablet 1     Sig: TAKE 1 TABLET(5 MG) BY MOUTH DAILY     Last Time Medication was Filled:    Amlodipine 8/22/2023  Losartan potassium 9/6/2023    Last Office Visit with Provider: 1/29/2024     Unable to approve refill,     Hypertension Medications Protocol [id number]/30/2024 03:36 PM   Protocol Details CMP or BMP in past 12 months    Last BP reading less than 140/90    EGFRCR or GFRAA > 50    In person appointment or virtual visit in the past 12 mos or appointment in next 3 mos          Appt. scheduled on 5/6/2024

## 2024-05-06 ENCOUNTER — TELEPHONE (OUTPATIENT)
Dept: FAMILY MEDICINE CLINIC | Facility: CLINIC | Age: 59
End: 2024-05-06

## 2024-05-06 NOTE — TELEPHONE ENCOUNTER
LMOM for patient regarding NO SHOW status for office visit on 05/06/2024 with Dr. Eduar Varner. Informed patient our office has a $40.00 NO SHOW FEE POLICY so we ask that you call at least 24 hours before your appt time. Informed patient can also use my-chart to cancel appt before 24 hours before your appointment. Patient will be charged $40.00 for any future NO SHOW appointments.

## 2024-09-17 ENCOUNTER — TELEPHONE (OUTPATIENT)
Dept: CASE MANAGEMENT | Age: 59
End: 2024-09-17

## 2024-11-18 ENCOUNTER — TELEPHONE (OUTPATIENT)
Dept: FAMILY MEDICINE CLINIC | Facility: CLINIC | Age: 59
End: 2024-11-18

## 2024-11-18 ENCOUNTER — OFFICE VISIT (OUTPATIENT)
Dept: FAMILY MEDICINE CLINIC | Facility: CLINIC | Age: 59
End: 2024-11-18
Payer: COMMERCIAL

## 2024-11-18 VITALS
HEIGHT: 57 IN | RESPIRATION RATE: 18 BRPM | TEMPERATURE: 98 F | DIASTOLIC BLOOD PRESSURE: 94 MMHG | HEART RATE: 76 BPM | WEIGHT: 255 LBS | BODY MASS INDEX: 55.02 KG/M2 | OXYGEN SATURATION: 93 % | SYSTOLIC BLOOD PRESSURE: 136 MMHG

## 2024-11-18 DIAGNOSIS — Z13.29 SCREENING FOR ENDOCRINE, METABOLIC, AND IMMUNITY DISORDER: ICD-10-CM

## 2024-11-18 DIAGNOSIS — I10 BENIGN ESSENTIAL HYPERTENSION: Primary | ICD-10-CM

## 2024-11-18 DIAGNOSIS — Z13.228 SCREENING FOR ENDOCRINE, METABOLIC, AND IMMUNITY DISORDER: ICD-10-CM

## 2024-11-18 DIAGNOSIS — E66.01 MORBID OBESITY, UNSPECIFIED OBESITY TYPE (HCC): ICD-10-CM

## 2024-11-18 DIAGNOSIS — Z13.0 SCREENING FOR ENDOCRINE, METABOLIC, AND IMMUNITY DISORDER: ICD-10-CM

## 2024-11-18 DIAGNOSIS — Z23 NEED FOR VACCINATION: ICD-10-CM

## 2024-11-18 DIAGNOSIS — E66.01 MORBID OBESITY WITH BMI OF 50.0-59.9, ADULT (HCC): Primary | ICD-10-CM

## 2024-11-18 DIAGNOSIS — E55.9 VITAMIN D DEFICIENCY: ICD-10-CM

## 2024-11-18 PROCEDURE — 3008F BODY MASS INDEX DOCD: CPT | Performed by: FAMILY MEDICINE

## 2024-11-18 PROCEDURE — 3075F SYST BP GE 130 - 139MM HG: CPT | Performed by: FAMILY MEDICINE

## 2024-11-18 PROCEDURE — 3080F DIAST BP >= 90 MM HG: CPT | Performed by: FAMILY MEDICINE

## 2024-11-18 PROCEDURE — 99214 OFFICE O/P EST MOD 30 MIN: CPT | Performed by: FAMILY MEDICINE

## 2024-11-18 RX ORDER — LOSARTAN POTASSIUM AND HYDROCHLOROTHIAZIDE 12.5; 1 MG/1; MG/1
1 TABLET ORAL DAILY
Qty: 90 TABLET | Refills: 1 | Status: SHIPPED | OUTPATIENT
Start: 2024-11-18

## 2024-11-18 RX ORDER — AMLODIPINE BESYLATE 5 MG/1
5 TABLET ORAL DAILY
Qty: 90 TABLET | Refills: 1 | Status: SHIPPED | OUTPATIENT
Start: 2024-11-18

## 2024-11-18 NOTE — TELEPHONE ENCOUNTER
Received a prior authorization from [facility] for     Wegovy .25mg/0.5ml    Key:  [id number]

## 2024-11-21 ENCOUNTER — NURSE ONLY (OUTPATIENT)
Dept: FAMILY MEDICINE CLINIC | Facility: CLINIC | Age: 59
End: 2024-11-21
Payer: COMMERCIAL

## 2024-11-21 ENCOUNTER — TELEPHONE (OUTPATIENT)
Dept: FAMILY MEDICINE CLINIC | Facility: CLINIC | Age: 59
End: 2024-11-21

## 2024-11-21 DIAGNOSIS — Z13.0 SCREENING FOR ENDOCRINE, METABOLIC, AND IMMUNITY DISORDER: ICD-10-CM

## 2024-11-21 DIAGNOSIS — Z13.228 SCREENING FOR ENDOCRINE, METABOLIC, AND IMMUNITY DISORDER: ICD-10-CM

## 2024-11-21 DIAGNOSIS — E66.01 MORBID OBESITY WITH BMI OF 50.0-59.9, ADULT (HCC): ICD-10-CM

## 2024-11-21 DIAGNOSIS — I10 BENIGN ESSENTIAL HYPERTENSION: ICD-10-CM

## 2024-11-21 DIAGNOSIS — E55.9 VITAMIN D DEFICIENCY: ICD-10-CM

## 2024-11-21 DIAGNOSIS — Z13.29 SCREENING FOR ENDOCRINE, METABOLIC, AND IMMUNITY DISORDER: ICD-10-CM

## 2024-11-21 LAB
ALBUMIN SERPL-MCNC: 4.4 G/DL (ref 3.2–4.8)
ALBUMIN/GLOB SERPL: 1.6 {RATIO} (ref 1–2)
ALP LIVER SERPL-CCNC: 125 U/L
ALT SERPL-CCNC: 16 U/L
ANION GAP SERPL CALC-SCNC: 4 MMOL/L (ref 0–18)
AST SERPL-CCNC: 20 U/L (ref ?–34)
BASOPHILS # BLD AUTO: 0.02 X10(3) UL (ref 0–0.2)
BASOPHILS NFR BLD AUTO: 0.3 %
BILIRUB SERPL-MCNC: 0.9 MG/DL (ref 0.2–1.1)
BUN BLD-MCNC: 18 MG/DL (ref 9–23)
CALCIUM BLD-MCNC: 9.7 MG/DL (ref 8.7–10.4)
CHLORIDE SERPL-SCNC: 109 MMOL/L (ref 98–112)
CHOLEST SERPL-MCNC: 195 MG/DL (ref ?–200)
CO2 SERPL-SCNC: 29 MMOL/L (ref 21–32)
CREAT BLD-MCNC: 0.88 MG/DL
EGFRCR SERPLBLD CKD-EPI 2021: 75 ML/MIN/1.73M2 (ref 60–?)
EOSINOPHIL # BLD AUTO: 0.11 X10(3) UL (ref 0–0.7)
EOSINOPHIL NFR BLD AUTO: 1.7 %
ERYTHROCYTE [DISTWIDTH] IN BLOOD BY AUTOMATED COUNT: 13.4 %
FASTING PATIENT LIPID ANSWER: YES
FASTING STATUS PATIENT QL REPORTED: YES
GLOBULIN PLAS-MCNC: 2.8 G/DL (ref 2–3.5)
GLUCOSE BLD-MCNC: 92 MG/DL (ref 70–99)
HCT VFR BLD AUTO: 44.3 %
HDLC SERPL-MCNC: 63 MG/DL (ref 40–59)
HGB BLD-MCNC: 14.7 G/DL
IMM GRANULOCYTES # BLD AUTO: 0.01 X10(3) UL (ref 0–1)
IMM GRANULOCYTES NFR BLD: 0.2 %
LDLC SERPL CALC-MCNC: 119 MG/DL (ref ?–100)
LYMPHOCYTES # BLD AUTO: 2.8 X10(3) UL (ref 1–4)
LYMPHOCYTES NFR BLD AUTO: 42.8 %
MCH RBC QN AUTO: 28.9 PG (ref 26–34)
MCHC RBC AUTO-ENTMCNC: 33.2 G/DL (ref 31–37)
MCV RBC AUTO: 87.2 FL
MONOCYTES # BLD AUTO: 0.65 X10(3) UL (ref 0.1–1)
MONOCYTES NFR BLD AUTO: 9.9 %
NEUTROPHILS # BLD AUTO: 2.95 X10 (3) UL (ref 1.5–7.7)
NEUTROPHILS # BLD AUTO: 2.95 X10(3) UL (ref 1.5–7.7)
NEUTROPHILS NFR BLD AUTO: 45.1 %
NONHDLC SERPL-MCNC: 132 MG/DL (ref ?–130)
OSMOLALITY SERPL CALC.SUM OF ELEC: 296 MOSM/KG (ref 275–295)
PLATELET # BLD AUTO: 243 10(3)UL (ref 150–450)
POTASSIUM SERPL-SCNC: 4.3 MMOL/L (ref 3.5–5.1)
PROT SERPL-MCNC: 7.2 G/DL (ref 5.7–8.2)
RBC # BLD AUTO: 5.08 X10(6)UL
SODIUM SERPL-SCNC: 142 MMOL/L (ref 136–145)
TRIGL SERPL-MCNC: 73 MG/DL (ref 30–149)
TSI SER-ACNC: 1.87 UIU/ML (ref 0.55–4.78)
VIT D+METAB SERPL-MCNC: 25 NG/ML (ref 30–100)
VLDLC SERPL CALC-MCNC: 13 MG/DL (ref 0–30)
WBC # BLD AUTO: 6.5 X10(3) UL (ref 4–11)

## 2024-11-21 PROCEDURE — 80050 GENERAL HEALTH PANEL: CPT | Performed by: FAMILY MEDICINE

## 2024-11-21 PROCEDURE — 82306 VITAMIN D 25 HYDROXY: CPT | Performed by: FAMILY MEDICINE

## 2024-11-21 PROCEDURE — 36415 COLL VENOUS BLD VENIPUNCTURE: CPT | Performed by: FAMILY MEDICINE

## 2024-11-21 PROCEDURE — 80061 LIPID PANEL: CPT | Performed by: FAMILY MEDICINE

## 2024-11-21 NOTE — PROGRESS NOTES
Patient came in for draw of ordered fasting labs. Patient drawn out of right  AC, x 1 attempt and tolerated well.  Light green lavender gold  tube drawn.

## 2024-11-22 RX ORDER — SEMAGLUTIDE 1 MG/.5ML
INJECTION, SOLUTION SUBCUTANEOUS
Qty: 2 ML | Refills: 1 | OUTPATIENT
Start: 2024-11-22

## 2024-11-22 NOTE — TELEPHONE ENCOUNTER
Requested Prescriptions     Refused Prescriptions Disp Refills    WEGOVY 1 MG/0.5ML Subcutaneous Solution Auto-injector [Pharmacy Med Name: WEGOVY 1MG/0.5ML  INJ (4  PENS)] 2 mL 1     Sig: INJECT 0.5ML(1MG) INTO THE SKIN ONE DAY A WEEK      Patient is currently on 0.25mg

## 2024-11-25 DIAGNOSIS — I10 BENIGN ESSENTIAL HYPERTENSION: ICD-10-CM

## 2024-11-25 DIAGNOSIS — E55.9 VITAMIN D DEFICIENCY: Primary | ICD-10-CM

## 2024-11-25 RX ORDER — FOLIC ACID 1 MG/1
1 TABLET ORAL WEEKLY
Qty: 12 CAPSULE | Refills: 0 | Status: SHIPPED | OUTPATIENT
Start: 2024-11-25

## 2024-11-25 NOTE — PROGRESS NOTES
CHIEF COMPLAINT:   Chief Complaint   Patient presents with    Medication Follow-Up    Weight Loss         HPI:     Ariadna Bullard is a 59 year old female presents for HTN and Vit D and obesity f-u.     HTN f-u: Pt states she is doing well on her current BP medications:  taking Amlodipine, Losartan-hydrochlorothiazide, and Propranolol.  She has no HA, no CP, palpitations, no leg swelling.     Previous HPI: Pt states she woke up this morning with + HA.  BP at home was in the 150s.  She has not had any medication for 3-4 wks. She has no CP, no SOB, no palpitations, and no leg swelling.     Previous HPI: Pt took her medication today, but is having elevated BP in office. When she last checked her BP at home, it was 130/82.  She feels anxious about her upcoming return to work next week. She has no CP, no SOB, palpitations, no leg swelling.       Previous HPI: Pt is currnetly on Losartan-hydrochlorothiazide and recently started on Propranolol by my office partner at her previous visit. This has been helping control the BP, She did not take her BP meds yet this morning since she did not eat (she tolerated the medications when taken with food).     Previous HPI: Pt states she mistakenly kept taking the old medication Losartan-hydrochlorothiazide 50/12.5 up until 1 week ago, she picke dup and started the new Rx for the higher dose at 100/12.5 mg.  She had been waking with a HA daily while on the old medication, but not since starting the new dose.  She has no CP, no palptiations, no leg swelling. She feels good on this medication.      Previous HPI from 5/2022: Pt was dx'd 7-8 yrs ago, has been on different medications, but currently on Losartan-hydrochlorothiazide 50/12.5 mg daily. She took her medication this morning. She has some leg swelling when the weather is warm out, but has no HA, CP, SOB, palpitations.      Obesity f-u: She was most recently on Ozempic, since Wegovy has been on backorder.  She would like to  restart Wegovy, if possible.     Previous HPI: She has not had her Wegovy since 2023 due to medication on backorder at her pharmacy.  She is worried since she has regained some of her weight- has lost 20 lbs so far.  Her pharmacy said there would be some available for pickup this week- she will go to pharmacy after this appt to check. The only SE she had was constipation, but was managed with stool softener.      Previous HPI: She has lost 11 lbs since starting Wegovy 2 months ago.  She has conscious eating and eats off of a saucer \"so that my body is in control of the eating.\"She is also doing intermittent fasting from 12p-8p daily. .  She experienced \"a tiny bit of nausea\" upon starting the medication, but is better now. Her pharmacy has been out of stock of Wegovy. Pt doesn't know what to do next.     Previous HPI from 2023: pt is frustrated since she has bee working the last 2 months with a  2 days a week, works out by herself 1 day a week.  She does circuit training and wt training.  She follows a low carb diet and incorporates intermittent fasting.  She would like help with wt loss and mentions her friend is on Ozempic. She had a normal TSH in 2022.  She has no personal or family Hx of thyroid Ca.     Vit D Def: has been low in past and took high dose Vitamin D.  Due to recheck level.                            HISTORY:  Past Medical History:    Anemia    Essential hypertension, benign    Hiatal hernia    History of gastric bypass    Osteoarthritis    Unspecified essential hypertension      Past Surgical History:   Procedure Laterality Date          x 2    Cholecystectomy      [facility]    Gastric bypass,obese<100cm dany-en-y      [facility]    Hernia surgery      [facility]      Family History   Problem Relation Age of Onset    Diabetes Father     Heart Disease Father         CHF    Breast Cancer Paternal Aunt 40    Genito-Urinary Disorder Sister 33         hysterectomy      Social History:   Social History     Socioeconomic History    Marital status:     Number of children: 2   Occupational History    Occupation:    Tobacco Use    Smoking status: Never    Smokeless tobacco: Never   Vaping Use    Vaping status: Never Used   Substance and Sexual Activity    Alcohol use: No    Drug use: No    Sexual activity: Yes     Partners: Male     Birth control/protection: Vasectomy   Social History Narrative    . Works as a . No alcohol or drug use.  Knee pain has interfered with her ability to exercise. 2 sons.     Social Drivers of Health      [facility]    Social Connections    Received from [facility], [facility]    Housing Stability        Medications (Active prior to today's visit):  Current Outpatient Medications   Medication Sig Dispense Refill    Losartan Potassium-HCTZ 100-12.5 MG Oral Tab Take 1 tablet by mouth daily. 90 tablet 1    amLODIPine 5 MG Oral Tab Take 1 tablet (5 mg total) by mouth daily. 90 tablet 1    semaglutide-weight management 0.25 MG/0.5ML Subcutaneous Solution Auto-injector Inject 0.5 mL (0.25 mg total) into the skin once a week. 2 mL 2    ELDERBERRY OR Take 1 tablet by mouth daily.      Multiple Vitamins-Minerals (MULTIVITAMIN ADULTS 50+ OR) Take 1 tablet by mouth daily.      ibuprofen 800 MG Oral Tab Take 1 tablet (800 mg total) by mouth 3 (three) times daily with meals.         Allergies:  Allergies[1]    PSFH elements reviewed from today and agreed except as otherwise stated in HPI.  ROS:     Review of Systems   Constitutional:  Negative for fatigue and unexpected weight change.   Cardiovascular:  Negative for chest pain, palpitations and leg swelling.   Neurological:  Negative for dizziness, light-headedness and headaches.         Pertinent positives and negatives  noted in the the HPI.    PHYSICAL EXAM:   BP (!) 136/94 (BP Location: Left arm, Patient Position: Sitting, Cuff Size: large)   Pulse 76   Temp 98.3 °F (36.8 °C) (Temporal)   Resp 18   Ht 4' 9\" (1.448 m)   Wt 255 lb (115.7 kg)   LMP 06/15/2016 (Approximate)   SpO2 93%   BMI 55.18 kg/m²   Vital signs reviewed.Appears stated age, well groomed.  Physical Exam  Vitals reviewed.   Constitutional:       Appearance: Normal appearance.   HENT:      Head: Normocephalic.   Cardiovascular:      Rate and Rhythm: Normal rate and regular rhythm.      Pulses: Normal pulses.      Heart sounds: Normal heart sounds.   Pulmonary:      Effort: Pulmonary effort is normal.      Breath sounds: Normal breath sounds.   Abdominal:      General: Bowel sounds are normal. There is no distension.      Palpations: Abdomen is soft.      Tenderness: There is no abdominal tenderness. There is no guarding.      Comments: No HSM   Musculoskeletal:      Cervical back: Normal range of motion and neck supple.      Right lower leg: No edema.      Left lower leg: No edema.   Lymphadenopathy:      Cervical: No cervical adenopathy.   Skin:     General: Skin is warm.   Neurological:      General: No focal deficit present.      Mental Status: She is alert and oriented to person, place, and time.   Psychiatric:         Behavior: Behavior normal.          LABS     No visits with results within 2 Month(s) from this visit.   Latest known visit with results is:   Office Visit on 05/16/2022   Component Date Value    Glucose 05/16/2022 94     Sodium 05/16/2022 139     Potassium 05/16/2022 3.9     Chloride 05/16/2022 107     CO2 05/16/2022 29.0     Anion Gap 05/16/2022 3     BUN 05/16/2022 16     Creatinine 05/16/2022 0.82     Calcium, Total 05/16/2022 9.3     Calculated Osmolality 05/16/2022 289     GFR, Non- 05/16/2022 79     GFR, -American 05/16/2022 91     AST 05/16/2022 18     ALT 05/16/2022 16     Alkaline Phosphatase 05/16/2022 105      Bilirubin, Total 05/16/2022 0.8     Total Protein 05/16/2022 7.1     Albumin 05/16/2022 3.7     Globulin  05/16/2022 3.4     A/G Ratio 05/16/2022 1.1     Patient Fasting for CMP? 05/16/2022 Yes     Cholesterol, Total 05/16/2022 219 (H)     HDL Cholesterol 05/16/2022 59     Triglycerides 05/16/2022 70     LDL Cholesterol 05/16/2022 148 (H)     VLDL 05/16/2022 13     Non HDL Chol 05/16/2022 160 (H)     Patient Fasting for Lipi* 05/16/2022 Yes     TSH 05/16/2022 1.940     WBC 05/16/2022 5.0     RBC 05/16/2022 4.76     HGB 05/16/2022 13.3     HCT 05/16/2022 42.8     PLT 05/16/2022 215.0     MCV 05/16/2022 89.9     MCH 05/16/2022 27.9     MCHC 05/16/2022 31.1     RDW 05/16/2022 13.1     Neutrophil Absolute Prel* 05/16/2022 2.65     Neutrophil Absolute 05/16/2022 2.65     Lymphocyte Absolute 05/16/2022 1.79     Monocyte Absolute 05/16/2022 0.51     Eosinophil Absolute 05/16/2022 0.00     Basophil Absolute 05/16/2022 0.01     Immature Granulocyte Abs* 05/16/2022 0.01     Neutrophil % 05/16/2022 53.3     Lymphocyte % 05/16/2022 36.0     Monocyte % 05/16/2022 10.3     Eosinophil % 05/16/2022 0.0     Basophil % 05/16/2022 0.2     Immature Granulocyte % 05/16/2022 0.2     Vitamin D, 25OH, Total 05/16/2022 23.3 (L)       REVIEWED THIS VISIT  ASSESSMENT/PLAN:   59 year old female with    1. Benign essential hypertension  - BP stable  - cont  Amlodipine 5 mg was started, Losartan-hydrochlorothiazide and Propranolol  - due to check labs  w  - Comp Metabolic Panel (14) [E]; Future  - Lipid Panel [E]; Future  - TSH W Reflex To Free T4 [E]; Future  - CBC W Differential W Platelet [E]; Future  - Losartan Potassium-HCTZ 100-12.5 MG Oral Tab; Take 1 tablet by mouth daily.  Dispense: 90 tablet; Refill: 1  - amLODIPine 5 MG Oral Tab; Take 1 tablet (5 mg total) by mouth daily.  Dispense: 90 tablet; Refill: 1    2. Vitamin D deficiency  - has bene low in the past; due to check    - Vitamin D [E]; Future    3. Morbid obesity, unspecified  obesity type (HCC)  - in past, pt has lost 20 lb over the last 3-4 months.  on Wegovy (pharmacy on back order been without medication since 7/2023)  - pt was Ozempic, which is also on back order now  - Tx sent for Wegovy, R/B/Se of new meds d/w pt  - f-u in 3 months    - semaglutide-weight management 0.25 MG/0.5ML Subcutaneous Solution Auto-injector; Inject 0.5 mL (0.25 mg total) into the skin once a week.  Dispense: 2 mL; Refill: 2    4. Need for vaccination    - INFLUENZA REFUSED [facility]    5. Screening for endocrine, metabolic, and immunity disorder    - TSH W Reflex To Free T4 [E]; Future  - CBC W Differential W Platelet [E]; Future      Meds This Visit:  Requested Prescriptions     Signed Prescriptions Disp Refills    Losartan Potassium-HCTZ 100-12.5 MG Oral Tab 90 tablet 1     Sig: Take 1 tablet by mouth daily.    amLODIPine 5 MG Oral Tab 90 tablet 1     Sig: Take 1 tablet (5 mg total) by mouth daily.    semaglutide-weight management 0.25 MG/0.5ML Subcutaneous Solution Auto-injector 2 mL 2     Sig: Inject 0.5 mL (0.25 mg total) into the skin once a week.       Health Maintenance:  Health Maintenance   Topic Date Due    Colorectal Cancer Screening  Never done    Mammogram  01/17/2014    Pap Smear  01/08/2018    COVID-19 Vaccine (4 - 2024-25 season) 09/01/2024    HTN: BP Follow-Up  12/18/2024    Annual Physical  01/29/2025    DTaP,Tdap,and Td Vaccines (2 - Td or Tdap) 01/29/2025 (Originally 4/24/2023)    Zoster Vaccines (1 of 2) 01/29/2025 (Originally 7/23/2013)    Influenza Vaccine (1) 06/30/2025 (Originally 10/1/2024)    Annual Depression Screening  Completed    Pneumococcal Vaccine: Birth to 64yrs  Aged Out         Patient/Caregiver Education: There are no barriers to learning. Medical education done.   Outcome: Patient verbalizes understanding and agrees with plan. Advised to call or RTC if symptoms persist or worsen.    Problem List:     Patient Active Problem List   Diagnosis    History of gastric bypass     Vitamin D deficiency    Benign essential hypertension    Hiatal hernia    Mojica neuroma, left    Morbid obesity, unspecified obesity type (HCC)    Primary osteoarthritis of right knee    Incisional hernia    Adjustment disorder with mixed anxiety and depressed mood    Adjustment insomnia       Imaging & Referrals:  INFLUENZA REFUSED [facility]     11/24/2024  Adriana Arceo MD      Patient understands plan and follow-up.  Return in about 3 months (around 2/18/2025) for Wegovy medication f-u in 3 months, annual physical in Feb 2025.            [1] No Known Allergies

## 2024-12-07 NOTE — TELEPHONE ENCOUNTER
Ariadna Schrader (Hill: [id number]) - [id number]  status: PA Response - ApprovedCreated: November 18th, 2024 [phone]

## 2024-12-19 ENCOUNTER — TELEPHONE (OUTPATIENT)
Dept: FAMILY MEDICINE CLINIC | Facility: CLINIC | Age: 59
End: 2024-12-19

## 2024-12-19 NOTE — TELEPHONE ENCOUNTER
Patient would like to go up to the next dosage she is feeling hungry on the 0.25 Wegovy and been on the 0.25 for 4 weeks.

## 2024-12-20 NOTE — TELEPHONE ENCOUNTER
LMTCB  Has she been tolerating lower dose without any adverse side effects?  Also, how is weight loss progressing?

## 2025-01-03 ENCOUNTER — TELEPHONE (OUTPATIENT)
Dept: FAMILY MEDICINE CLINIC | Facility: CLINIC | Age: 60
End: 2025-01-03

## 2025-01-03 NOTE — TELEPHONE ENCOUNTER
Pt states doing well on the Wegovy, slight nausea but tolerable and goes away with a glass of water.  Pt has lost 12lbs.  LOV 11/18/24- advised to f/u in 3 months  Pt looking to go up to next dose  Please advise

## 2025-01-03 NOTE — TELEPHONE ENCOUNTER
Received prior authoirzation for Wegovy 0.5ml/0.5ml from [facility]      [id number]    Put in nurses bin

## 2025-01-03 NOTE — TELEPHONE ENCOUNTER
Called patient she will look at her schedule and then call to make an appointment for a physical and medication follow up and will get her labs done also

## 2025-01-03 NOTE — TELEPHONE ENCOUNTER
Ok to increase to semaglutide 0.5 mg, Rx signed and sent.    Please have her schedule her f-u appt with me in Feb 2025. Is also due to repeat labs prior to visit. She is alos due for annual physical, can make this her annual physical appt and address it all. Thanks.

## 2025-01-03 NOTE — TELEPHONE ENCOUNTER
Too soon to fill new dose. Med was already approved on 12/7/24.  Pt refilled last dose on 12/18/24. Unable to fill next dose till next week. Pt aware. Prior auth not needed

## 2025-01-15 ENCOUNTER — TELEPHONE (OUTPATIENT)
Dept: FAMILY MEDICINE CLINIC | Facility: CLINIC | Age: 60
End: 2025-01-15

## 2025-01-16 NOTE — TELEPHONE ENCOUNTER
See TE 1/3/2025    status: PA Response - ApprovedCreated: January 15th, 2025 [phone]: January 16th, 2025  Informed patient, patient verbalized understanding           1/16/25  9:06 AM  Note     Spoke with patient, patient states insurance needs a PA for the new dose patient will be taking. Patient was approved for 0.25 and is now at 0.5 ml.   Patient provided phone number to complete PA over the phone.  [phone]   Plan # [id number]  Case # [id number]  Called [employer] [facility] PA line to complete PA over the phone. They informed me to complete PA on covermymeds with key code [id number]   PA completed on covermymeds waiting on insurance determination.  Ariadna Schrader (Key: [id number])  Rx #: [id number]  Wegovy 0.5MG/0.5ML auto-injectors

## 2025-01-27 ENCOUNTER — TELEPHONE (OUTPATIENT)
Dept: FAMILY MEDICINE CLINIC | Facility: CLINIC | Age: 60
End: 2025-01-27

## 2025-01-27 NOTE — TELEPHONE ENCOUNTER
Pt wants to know if it is ok for her to take Prevagen with all her other medications.    Its an OTC medication for memory

## 2025-02-03 DIAGNOSIS — E55.9 VITAMIN D DEFICIENCY: ICD-10-CM

## 2025-02-05 RX ORDER — FOLIC ACID 1 MG/1
1 TABLET ORAL WEEKLY
Qty: 12 CAPSULE | Refills: 0 | OUTPATIENT
Start: 2025-02-05

## 2025-02-11 ENCOUNTER — PATIENT OUTREACH (OUTPATIENT)
Dept: FAMILY MEDICINE CLINIC | Facility: CLINIC | Age: 60
End: 2025-02-11

## 2025-03-03 ENCOUNTER — OFFICE VISIT (OUTPATIENT)
Dept: FAMILY MEDICINE CLINIC | Facility: CLINIC | Age: 60
End: 2025-03-03
Payer: COMMERCIAL

## 2025-03-03 VITALS
RESPIRATION RATE: 16 BRPM | HEIGHT: 57 IN | BODY MASS INDEX: 52.39 KG/M2 | HEART RATE: 69 BPM | DIASTOLIC BLOOD PRESSURE: 70 MMHG | SYSTOLIC BLOOD PRESSURE: 128 MMHG | WEIGHT: 242.81 LBS | OXYGEN SATURATION: 98 % | TEMPERATURE: 97 F

## 2025-03-03 DIAGNOSIS — Z00.00 ANNUAL PHYSICAL EXAM: Primary | ICD-10-CM

## 2025-03-03 DIAGNOSIS — E55.9 VITAMIN D DEFICIENCY: ICD-10-CM

## 2025-03-03 DIAGNOSIS — Z12.11 ENCOUNTER FOR SCREENING COLONOSCOPY: ICD-10-CM

## 2025-03-03 DIAGNOSIS — Z12.31 ENCOUNTER FOR SCREENING MAMMOGRAM FOR MALIGNANT NEOPLASM OF BREAST: ICD-10-CM

## 2025-03-03 DIAGNOSIS — I10 BENIGN ESSENTIAL HYPERTENSION: ICD-10-CM

## 2025-03-03 DIAGNOSIS — Z12.4 SCREENING FOR CERVICAL CANCER: ICD-10-CM

## 2025-03-03 DIAGNOSIS — E66.01 MORBID OBESITY WITH BMI OF 50.0-59.9, ADULT (HCC): ICD-10-CM

## 2025-03-03 PROCEDURE — 99396 PREV VISIT EST AGE 40-64: CPT | Performed by: FAMILY MEDICINE

## 2025-03-03 RX ORDER — IBUPROFEN 800 MG/1
800 TABLET, FILM COATED ORAL
Qty: 90 TABLET | Refills: 0 | Status: SHIPPED | OUTPATIENT
Start: 2025-03-03

## 2025-03-03 RX ORDER — SEMAGLUTIDE 1 MG/.5ML
1 INJECTION, SOLUTION SUBCUTANEOUS WEEKLY
Qty: 4 EACH | Refills: 0 | Status: SHIPPED | OUTPATIENT
Start: 2025-03-03

## 2025-03-03 RX ORDER — IBUPROFEN 800 MG/1
800 TABLET, FILM COATED ORAL
Qty: 30 TABLET | Refills: 0 | Status: SHIPPED | OUTPATIENT
Start: 2025-03-03 | End: 2025-03-03

## 2025-03-09 NOTE — PROGRESS NOTES
Chief Complaint   Patient presents with    Physical       HPI:   Ariadna Bullard is a 59 year old female who presents for a complete physical without gyne exam.   Patient feels well, dental visit up to date, no hearing problem.  Vaccinations : declines vaccines    LMP: menopause  Sexual activity:monogamy   Contraception: menopause  Exercise:  chair exercises .  Diet:  regular, intermittent fasting    Wt Readings from Last 3 Encounters:   03/03/25 242 lb 12.8 oz (110.1 kg)   11/18/24 255 lb (115.7 kg)   01/29/24 247 lb 12.8 oz (112.4 kg)      BP Readings from Last 3 Encounters:   03/03/25 128/70   11/18/24 (!) 136/94   01/29/24 (!) 140/92     Patient's last menstrual period was 06/15/2016 (approximate).     Annual physical:  Overall pt states she feels well.     LMP: menopause  Sexually Active: monogamous  Last pap smear: referral to Gyn  Last mammogram: overdue, ordered  Last Colon Ca screening: overdue, ordered Cologuard  Last DEXA Scan: n/a    Exercise: chair exercises  Diet:regular, intermittent fasting     HTN f-u: Pt states she is doing well on her current BP medications:  taking Amlodipine, Losartan-hydrochlorothiazide, and Propranolol.  She has no HA, no CP, palpitations, no leg swelling.     Previous HPI: Pt states she woke up this morning with + HA.  BP at home was in the 150s.  She has not had any medication for 3-4 wks. She has no CP, no SOB, no palpitations, and no leg swelling.     Previous HPI: Pt took her medication today, but is having elevated BP in office. When she last checked her BP at home, it was 130/82.  She feels anxious about her upcoming return to work next week. She has no CP, no SOB, palpitations, no leg swelling.       Previous HPI: Pt is currnetly on Losartan-hydrochlorothiazide and recently started on Propranolol by my office partner at her previous visit. This has been helping control the BP, She did not take her BP meds yet this morning since she did not eat (she tolerated the  medications when taken with food).     Previous HPI: Pt states she mistakenly kept taking the old medication Losartan-hydrochlorothiazide 50/12.5 up until 1 week ago, she picke dup and started the new Rx for the higher dose at 100/12.5 mg.  She had been waking with a HA daily while on the old medication, but not since starting the new dose.  She has no CP, no palptiations, no leg swelling. She feels good on this medication.      Previous HPI from 5/2022: Pt was dx'd 7-8 yrs ago, has been on different medications, but currently on Losartan-hydrochlorothiazide 50/12.5 mg daily. She took her medication this morning. She has some leg swelling when the weather is warm out, but has no HA, CP, SOB, palpitations.      Obesity f-u: Has been doing well on Wegovy 0.25 mg, tolerating well- has no abdominal pain, bloating, constipation.  Is plateaing with weight and still feeling hungry. She would like to increase her dose. See exercise and diet listed above.    Previous HPI: She was most recently on Ozempic, since Wegovy has been on backorder.  She would like to restart Wegovy, if possible.     Previous HPI: She has not had her Wegovy since 7/2023 due to medication on backorder at her pharmacy.  She is worried since she has regained some of her weight- has lost 20 lbs so far.  Her pharmacy said there would be some available for pickup this week- she will go to pharmacy after this appt to check. The only SE she had was constipation, but was managed with stool softener.      Previous HPI: She has lost 11 lbs since starting Wegovy 2 months ago.  She has conscious eating and eats off of a saucer \"so that my body is in control of the eating.\"She is also doing intermittent fasting from 12p-8p daily. .  She experienced \"a tiny bit of nausea\" upon starting the medication, but is better now. Her pharmacy has been out of stock of Wegovy. Pt doesn't know what to do next.     Previous HPI from 4/2023: pt is frustrated since she has bee  working the last 2 months with a  2 days a week, works out by herself 1 day a week.  She does circuit training and wt training.  She follows a low carb diet and incorporates intermittent fasting.  She would like help with wt loss and mentions her friend is on Ozempic. She had a normal TSH in 2022.  She has no personal or family Hx of thyroid Ca.     Vit D Def: has been low in past and took high dose Vitamin D.  Due to recheck level.                   Current Outpatient Medications   Medication Sig Dispense Refill    semaglutide-weight management (WEGOVY) 1 MG/0.5ML Subcutaneous Solution Auto-injector Inject 0.5 mL (1 mg total) into the skin once a week. 4 each 0    ibuprofen 800 MG Oral Tab Take 1 tablet (800 mg total) by mouth 3 (three) times daily with meals. 90 tablet 0    Cholecalciferol (VITAMIN D3) 1.25 MG (96600 UT) Oral Cap Take 1 capsule by mouth once a week. 12 capsule 0    Losartan Potassium-HCTZ 100-12.5 MG Oral Tab Take 1 tablet by mouth daily. 90 tablet 1    amLODIPine 5 MG Oral Tab Take 1 tablet (5 mg total) by mouth daily. 90 tablet 1    ELDERBERRY OR Take 1 tablet by mouth daily.      Multiple Vitamins-Minerals (MULTIVITAMIN ADULTS 50+ OR) Take 1 tablet by mouth daily.        Past Medical History:    Anemia    Essential hypertension, benign    Hiatal hernia    History of gastric bypass    Osteoarthritis    Unspecified essential hypertension      Past Surgical History:   Procedure Laterality Date          x 2    Cholecystectomy      [facility]    Gastric bypass,obese<100cm dany-en-y      [facility]    Hernia surgery      [facility]      Family History   Problem Relation Age of Onset    Diabetes Father     Heart Disease Father         CHF    Breast Cancer Paternal Aunt 40    Genito-Urinary Disorder Sister 33        hysterectomy      Social History:  Social History     Socioeconomic History    Marital status:     Number of children: 2   Occupational  History    Occupation:    Tobacco Use    Smoking status: Never    Smokeless tobacco: Never   Vaping Use    Vaping status: Never Used   Substance and Sexual Activity    Alcohol use: No    Drug use: No    Sexual activity: Yes     Partners: Male     Birth control/protection: Vasectomy   Social History Narrative    . Works as a . No alcohol or drug use.  Knee pain has interfered with her ability to exercise. 2 sons.     Social Drivers of Health      [facility]    Housing Stability       Allergies:  Allergies[1]     REVIEW OF SYSTEMS:     Review of Systems   Constitutional:  Negative for appetite change, fatigue and unexpected weight change.   Cardiovascular:  Negative for chest pain, palpitations and leg swelling.   Gastrointestinal:  Negative for abdominal pain, constipation, diarrhea, nausea and vomiting.   Genitourinary:  Negative for dysuria.   Neurological:  Negative for headaches.        EXAM:   /70 (BP Location: Left arm, Patient Position: Sitting, Cuff Size: large)   Pulse 69   Temp 97.2 °F (36.2 °C) (Temporal)   Resp 16   Ht 4' 9\" (1.448 m)   Wt 242 lb 12.8 oz (110.1 kg)   LMP 06/15/2016 (Approximate)   SpO2 98%   BMI 52.54 kg/m²    GENERAL: WD/WN in no acute distress.   HEENT: PERRLA and EOMI.  OP moist no lesions.TM WNL, jacklyn.Normal ears canals bilaterally.  Neck is supple, with no cervical LAD or thyroid abnormalities.  LUNGS: are clear to auscultation bilaterally, with no wheeze, rhonchi, or rales.   HEART: is RRR.  S1, S2, with no murmurs,clicks, gallops  BREAST:deferred  ABDOMEN: is soft,NBS, NT/ND with no HSM.  No rebound or guarding. No CVA tenderness, no hernias.   EXAM: deferred  RECTAL EXAM: deferred  NEURO: Cranial nerves II-XII normal,no focal abnormalities, and reflexes coordination and gait normal and symmetric.Sensation intact.  EXTREMETIES: are  symmetric with no cyanosis, clubbing, or edema.  MS: Normal muscles tones, no joints abnormalities.  SKIN: Normal color, turgor, no lesions, rashes or wounds.  PSYCH: normal affect and mood.    ASSESSMENT AND PLAN:     59 year old female with     1. Annual physical exam  Routine labs ordered today, await results. Counseled pt on healthy lifestyle changes. Vaccines today: declines vaccines . Contraception: menopause .    Last pap smear: referral to Gyn  Last mammogram: overdue, ordered  Last Colon Ca screening: overdue, ordered Cologuard  Last DEXA Scan: n/a      2. Benign essential hypertension  - BP stable  - cont  Amlodipine 5 mg was started, Losartan-hydrochlorothiazide and Propranolol  - due to check labs    - LIPID PANEL [8770] [Q]  - COMP METABOLIC PANEL [57874] [Q]    3. Vitamin D deficiency  - has bene low in the past  - due to recheck    - VITAMIN D, 25-HYDROXY [22743][Q]    4. Encounter for screening mammogram for malignant neoplasm of breast    - [facility] LIS 2D+3D SCREENING BILAT (CPT=77067/04620); Future    5. Encounter for screening colonoscopy    - COLOGUARD COLON CANCER SCREENING (EXTERNAL)    6. Screening for cervical cancer    - OBG Referral - [facility] Network    7. Morbid obesity with BMI of 50.0-59.9, adult (HCC)  - in past, pt has lost 20 lb over the last 3-4 months.  on Wegovy (pharmacy on back order been without medication since 7/2023)  - pt was on Ozempic, since was on back order, switched to Wegovy   - INCREASE Wegovy to 0.50 weekly, R/B/SE of med reviewed with pt  - f-u in 3 months     - semaglutide-weight management (WEGOVY) 1 MG/0.5ML Subcutaneous Solution Auto-injector; Inject 0.5 mL (1 mg total) into the skin once a week.  Dispense: 4 each; Refill: 0        Pt's was recommended low fat diet and aerobic exercise 30 minutes three times weekly.   Health maintenance.   Osteoporosis prevention addressed  Recommended whole food type diet, eliminate processed food/low sugar and low sat fat  diet      The patient indicates understanding of these issues and agrees to the plan.    Return in about 3 months (around 6/3/2025) for HTN, weight loss medication f-u.       [1] No Known Allergies

## 2025-04-14 DIAGNOSIS — E66.01 MORBID OBESITY WITH BMI OF 50.0-59.9, ADULT (HCC): ICD-10-CM

## 2025-04-14 NOTE — TELEPHONE ENCOUNTER
Medication: semaglutide-weight management (WEGOVY)     Dose: 1 MG/0.5ML Subcutaneous Solution Auto-injector     Times per day:  into the skin once a week.     Last office visit:  03/03/2025  Due back to office:     3 months (around 6/3/2025) for HTN,   weight loss medication f-u.       Future office visit:  06/02/2025  Has this been refilled since last office visit:  none    Pharmacy:  [id number] [place] [address] AT [facility], [phone], [phone] [[id number]]     
Refill(s) Requested:   Requested Prescriptions     Pending Prescriptions Disp Refills    semaglutide-weight management (WEGOVY) 1 MG/0.5ML Subcutaneous Solution Auto-injector 4 each 0     Sig: Inject 0.5 mL (1 mg total) into the skin once a week.     Medication Last Prescribed: 3/3/25 4 each 0 refills     Has dose or medication been changed    since last prescription? *Review notes*    []  Yes       [x]  No     Last office visit: 3/3/2025 (in-office)  Visit date not found (virtual visit)     Relevant details from LOV note: Morbid obesity with BMI of 50.0-59.9, adult ([facility])  - in past, pt has lost 20 lb over the last 3-4 months.  on Wegovy (pharmacy on back order been without medication since 7/2023)  - pt was on Ozempic, since was on back order, switched to Wegovy   - INCREASE Wegovy to 0.50 weekly, R/B/SE of med reviewed with pt  - f-u in 3 months     Patient was asked to follow-up in: Return in about 3 months (around 6/3/2025) for HTN, weight loss medication f-u.   Appointment due: June 2025    Future Appointments: 6/2/2025 Adriana Arceo MD     Action taken:  [x] Request routed to provider for review    
84

## 2025-04-15 RX ORDER — SEMAGLUTIDE 1 MG/.5ML
1 INJECTION, SOLUTION SUBCUTANEOUS WEEKLY
Qty: 4 EACH | Refills: 0 | Status: SHIPPED | OUTPATIENT
Start: 2025-04-15

## 2025-05-30 ENCOUNTER — TELEPHONE (OUTPATIENT)
Dept: FAMILY MEDICINE CLINIC | Facility: CLINIC | Age: 60
End: 2025-05-30

## 2025-05-30 DIAGNOSIS — E66.01 MORBID OBESITY WITH BMI OF 50.0-59.9, ADULT (HCC): Primary | ICD-10-CM

## 2025-05-30 DIAGNOSIS — E66.01 MORBID OBESITY WITH BMI OF 50.0-59.9, ADULT (HCC): ICD-10-CM

## 2025-05-30 RX ORDER — SEMAGLUTIDE 1 MG/.5ML
1 INJECTION, SOLUTION SUBCUTANEOUS WEEKLY
Qty: 2 ML | Refills: 0 | Status: SHIPPED | OUTPATIENT
Start: 2025-05-30

## 2025-05-30 NOTE — TELEPHONE ENCOUNTER
Medication and dose requested: Wegoy    Pharmacy name/location:      [facility] #[id number] - [place] [address] AT [facility] ([facility], [phone], [phone]  [address]  [facility] [address]  Phone: [phone] Fax: [phone]           Additional notes:   Pt is on last injection

## 2025-05-30 NOTE — TELEPHONE ENCOUNTER
Pt has been on the Wegovy since 12/2024.  Dose need to progress up. Has been on the Wegovy 0.25mg, 0.5mg and the 1mg.    PA KEY [id number]    Prior authorization submitted to patient's plan via CoverMyMeds on 5/30/2025. Await response.

## 2025-05-30 NOTE — TELEPHONE ENCOUNTER
Requested Prescriptions     Pending Prescriptions Disp Refills    semaglutide-weight management (WEGOVY) 1 MG/0.5ML Subcutaneous Solution Auto-injector 2 mL 0     Sig: Inject 0.5 mL (1 mg total) into the skin once a week.     LOV 3/3/2025     At 3/3/25 appt Dr. Sneed sent a script and additional refills were placed in wrong spot.     Patient was asked to follow-up in: 3 months    Appointment scheduled: 6/2/2025 Adriana Arceo MD     Medication refilled per protocol.

## 2025-06-02 ENCOUNTER — TELEMEDICINE (OUTPATIENT)
Dept: FAMILY MEDICINE CLINIC | Facility: CLINIC | Age: 60
End: 2025-06-02
Payer: COMMERCIAL

## 2025-06-02 DIAGNOSIS — I10 BENIGN ESSENTIAL HYPERTENSION: Primary | ICD-10-CM

## 2025-06-02 DIAGNOSIS — Z12.11 COLON CANCER SCREENING: ICD-10-CM

## 2025-06-02 DIAGNOSIS — E55.9 VITAMIN D DEFICIENCY: ICD-10-CM

## 2025-06-02 DIAGNOSIS — E66.01 MORBID OBESITY WITH BMI OF 50.0-59.9, ADULT (HCC): ICD-10-CM

## 2025-06-02 RX ORDER — AMLODIPINE BESYLATE 5 MG/1
5 TABLET ORAL DAILY
Qty: 90 TABLET | Refills: 1 | Status: SHIPPED | OUTPATIENT
Start: 2025-06-02

## 2025-06-02 RX ORDER — LOSARTAN POTASSIUM AND HYDROCHLOROTHIAZIDE 12.5; 1 MG/1; MG/1
1 TABLET ORAL DAILY
Qty: 90 TABLET | Refills: 1 | Status: SHIPPED | OUTPATIENT
Start: 2025-06-02

## 2025-06-02 NOTE — PROGRESS NOTES
The following individual(s) verbally consented to be recorded using ambient AI listening technology and understand that they can each withdraw their consent to this listening technology at any point by asking the clinician to turn off or pause the recording:    Patient name: Ariadna Bullard  Additional names:  no

## 2025-06-02 NOTE — PROGRESS NOTES
Video Visit    This visit is conducted using Telemedicine with live, interactive video and audio.    Ariadna NICHOLS Aleah  verbally consents to a Video visit.    Patient understands and accepts financial responsibility for any deductible, co-insurance and/or co-pays associated with this service.    Duration of the service: 13:24 minutes      Summary of topics discussed:     History of Present Illness  Wegovy f-u: She has lost 47 pounds since March, primarily due to Wegovy and lifestyle changes. She is on a 1 mg dose of Wegovy with minimal side effects, experiencing only slight stomach pain initially, which has resolved. She follows an intermittent fasting regimen from 1 PM to 9 PM, with meals consisting of protein, vegetables, and limited carbohydrates. Her physical activity has increased, including more frequent dog walking.    Her blood pressure is well-controlled on amlodipine and losartan, with no lightheadedness or dizziness. She reports reduced knee pain, likely due to weight loss.    Vit D Def: She feels very tired over the past few weeks, particularly after a trip to [place]. She plans to complete lab work, including vitamin D levels, as she has had low vitamin D previously.     HTN f-u: Pt states she is doing well on her current BP medications:  taking Amlodipine, Losartan-hydrochlorothiazide, and Propranolol.  She has no HA, no CP, palpitations, no leg swelling. She checks her BP at home regularly has been in the 120s. She thinks her BP is better controlled now that she has lost ~40+ lbs.     Previous HPI: Pt states she woke up this morning with + HA.  BP at home was in the 150s.  She has not had any medication for 3-4 wks. She has no CP, no SOB, no palpitations, and no leg swelling.     Previous HPI: Pt took her medication today, but is having elevated BP in office. When she last checked her BP at home, it was 130/82.  She feels anxious about her upcoming return to work next week. She has no CP, no SOB,  palpitations, no leg swelling.       Previous HPI: Pt is currnetly on Losartan-hydrochlorothiazide and recently started on Propranolol by my office partner at her previous visit. This has been helping control the BP, She did not take her BP meds yet this morning since she did not eat (she tolerated the medications when taken with food).     Previous HPI: Pt states she mistakenly kept taking the old medication Losartan-hydrochlorothiazide 50/12.5 up until 1 week ago, she picke dup and started the new Rx for the higher dose at 100/12.5 mg.  She had been waking with a HA daily while on the old medication, but not since starting the new dose.  She has no CP, no palptiations, no leg swelling. She feels good on this medication.      Previous HPI from 5/2022: Pt was dx'd 7-8 yrs ago, has been on different medications, but currently on Losartan-hydrochlorothiazide 50/12.5 mg daily. She took her medication this morning. She has some leg swelling when the weather is warm out, but has no HA, CP, SOB, palpitations.      Obesity f-u: Has been doing well on Wegovy 0.25 mg, tolerating well- has no abdominal pain, bloating, constipation.  Is plateaing with weight and still feeling hungry. She would like to increase her dose. See exercise and diet listed above.     Previous HPI: She was most recently on Ozempic, since Wegovy has been on backorder.  She would like to restart Wegovy, if possible.     Previous HPI: She has not had her Wegovy since 7/2023 due to medication on backorder at her pharmacy.  She is worried since she has regained some of her weight- has lost 20 lbs so far.  Her pharmacy said there would be some available for pickup this week- she will go to pharmacy after this appt to check. The only SE she had was constipation, but was managed with stool softener.      Previous HPI: She has lost 11 lbs since starting Wegovy 2 months ago.  She has conscious eating and eats off of a saucer \"so that my body is in control of the  eating.\"She is also doing intermittent fasting from 12p-8p daily. .  She experienced \"a tiny bit of nausea\" upon starting the medication, but is better now. Her pharmacy has been out of stock of Wegovy. Pt doesn't know what to do next.     Previous HPI from 2023: pt is frustrated since she has bee working the last 2 months with a  2 days a week, works out by herself 1 day a week.  She does circuit training and wt training.  She follows a low carb diet and incorporates intermittent fasting.  She would like help with wt loss and mentions her friend is on Ozempic. She had a normal TSH in 2022.  She has no personal or family Hx of thyroid Ca.     Vit D Def: has been low in past and took high dose Vitamin D.  Due to recheck level.    ROS: as stated per HPI  Physical Exam: Exam is limited due to no face to face visit today. Pt is awake and alert, does not appear to be in acute distress.      Assessment/Plan:   Benign essential hypertension  - BP stable  - cont  Amlodipine 5 mg, Losartan-hydrochlorothiazide and Propranolol  - due to check labs        Vitamin D deficiency  - has been low in the past  - due to recheck        Morbid obesity with BMI of 50.0-59.9, adult (HCC)  - currently down 47 lbs since 3/2025  - pt was on Ozempic, since was on back order, switched to Wegovy   - INCREASED Wegovy to 1.0 weekly, still effective  - cont healthy diet and lifestyle changes  - f-u in 3 months     The patient and provider have a longitudinal relationship to address/treat the serious or   complex condition(s) as stated in this encounter.     Total time spent for audio/video visit and note writin:24  min      Orders Placed This Encounter    Losartan Potassium-HCTZ 100-12.5 MG Oral Tab     Sig: Take 1 tablet by mouth daily.     Dispense:  90 tablet     Refill:  1    amLODIPine 5 MG Oral Tab     Sig: Take 1 tablet (5 mg total) by mouth daily.     Dispense:  90 tablet     Refill:  1      No orders of the  defined types were placed in this encounter.         Return in about 3 months (around 9/2/2025) for Wegovy medication f-u, HTN f-u.      Adriana Arceo MD

## 2025-07-01 LAB
ALBUMIN/GLOBULIN RATIO: 1.6 (CALC) (ref 1–2.5)
ALBUMIN: 4.2 G/DL (ref 3.6–5.1)
ALKALINE PHOSPHATASE: 118 U/L (ref 37–153)
ALT: 12 U/L (ref 6–29)
AST: 15 U/L (ref 10–35)
BILIRUBIN, TOTAL: 1.1 MG/DL (ref 0.2–1.2)
BUN: 14 MG/DL (ref 7–25)
CALCIUM: 9.7 MG/DL (ref 8.6–10.4)
CARBON DIOXIDE: 27 MMOL/L (ref 20–32)
CHLORIDE: 107 MMOL/L (ref 98–110)
CHOL/HDLC RATIO: 3.1 (CALC)
CHOLESTEROL, TOTAL: 191 MG/DL
CREATININE: 0.81 MG/DL (ref 0.5–1.05)
EGFR: 83 ML/MIN/1.73M2
GLOBULIN: 2.7 G/DL (CALC) (ref 1.9–3.7)
GLUCOSE: 88 MG/DL (ref 65–99)
HDL CHOLESTEROL: 62 MG/DL
LDL-CHOLESTEROL: 116 MG/DL (CALC)
NON-HDL CHOLESTEROL: 129 MG/DL (CALC)
POTASSIUM: 4.2 MMOL/L (ref 3.5–5.3)
PROTEIN, TOTAL: 6.9 G/DL (ref 6.1–8.1)
SODIUM: 142 MMOL/L (ref 135–146)
TRIGLYCERIDES: 52 MG/DL
VITAMIN D, 25-OH, TOTAL: 42 NG/ML (ref 30–100)

## 2025-07-11 DIAGNOSIS — E66.01 MORBID OBESITY WITH BMI OF 50.0-59.9, ADULT (HCC): ICD-10-CM

## 2025-07-11 NOTE — TELEPHONE ENCOUNTER
Pharmacy name/location:      [facility] #[id number] [place] [address] AT [facility] ([facility], [phone], [phone]  [address]  [facility] [address]  Phone: [phone] Fax: [phone]    Medication and dose requested: Current Medications[1]     Wegovy    Additional notes:           [1]   Current Outpatient Medications   Medication Sig Dispense Refill    semaglutide-weight management 1.7 MG/0.75ML Subcutaneous Solution Auto-injector Inject 0.75 mL (1.7 mg total) into the skin once a week. 3 mL 0    Losartan Potassium-HCTZ 100-12.5 MG Oral Tab Take 1 tablet by mouth daily. 90 tablet 1    amLODIPine 5 MG Oral Tab Take 1 tablet (5 mg total) by mouth daily. 90 tablet 1    semaglutide-weight management (WEGOVY) 1 MG/0.5ML Subcutaneous Solution Auto-injector Inject 0.5 mL (1 mg total) into the skin once a week. 2 mL 0    ibuprofen 800 MG Oral Tab Take 1 tablet (800 mg total) by mouth 3 (three) times daily with meals. 90 tablet 0    Cholecalciferol (VITAMIN D3) 1.25 MG (06580 UT) Oral Cap Take 1 capsule by mouth once a week. 12 capsule 0    ELDERBERRY OR Take 1 tablet by mouth daily.      Multiple Vitamins-Minerals (MULTIVITAMIN ADULTS 50+ OR) Take 1 tablet by mouth daily.

## 2025-07-15 NOTE — TELEPHONE ENCOUNTER
Please review; protocol failed/ has no protocol      Which GLP is the patient on: Semaglutide   Current dose: 1.7 mg   Patient seeking refill or increase to next dose: refill  How many doses of current dose completed:   Side effects, if any:   Current weight / how much weight loss: 242 lbs   Last visit: 03/03/2025

## 2025-08-04 ENCOUNTER — HOSPITAL ENCOUNTER (OUTPATIENT)
Dept: MAMMOGRAPHY | Age: 60
Discharge: HOME | End: 2025-08-04
Attending: FAMILY MEDICINE

## 2025-08-04 DIAGNOSIS — Z12.31 ENCOUNTER FOR SCREENING MAMMOGRAM FOR MALIGNANT NEOPLASM OF BREAST: ICD-10-CM

## 2025-08-04 PROCEDURE — 77067 SCR MAMMO BI INCL CAD: CPT | Performed by: FAMILY MEDICINE

## 2025-08-04 PROCEDURE — 77063 BREAST TOMOSYNTHESIS BI: CPT | Performed by: FAMILY MEDICINE

## (undated) DIAGNOSIS — H61.23 BILATERAL IMPACTED CERUMEN: Primary | ICD-10-CM